# Patient Record
Sex: MALE | Race: WHITE | NOT HISPANIC OR LATINO | Employment: OTHER | URBAN - METROPOLITAN AREA
[De-identification: names, ages, dates, MRNs, and addresses within clinical notes are randomized per-mention and may not be internally consistent; named-entity substitution may affect disease eponyms.]

---

## 2017-06-28 ENCOUNTER — TELEPHONE (OUTPATIENT)
Dept: DERMATOLOGY | Facility: CLINIC | Age: 67
End: 2017-06-28

## 2017-06-28 NOTE — TELEPHONE ENCOUNTER
----- Message from Lisbeth Soria sent at 6/28/2017  3:53 PM CDT -----  Contact: pts wife at [phone]  Imani,  Pt has a non healing lesion on his hand and needs appt asap.  Non healing and bleeds.  Wife can be reached at [phone].

## 2017-06-28 NOTE — TELEPHONE ENCOUNTER
Spoke to patient and was able to book an appointment on July 10th with Dr. Carmichael. He was pleased with this appointment.

## 2017-08-01 ENCOUNTER — LAB VISIT (OUTPATIENT)
Dept: LAB | Facility: HOSPITAL | Age: 67
End: 2017-08-01
Payer: MEDICARE

## 2017-08-01 DIAGNOSIS — R39.9 LOWER URINARY TRACT SYMPTOMS (LUTS): ICD-10-CM

## 2017-08-01 DIAGNOSIS — C61 PROSTATE CANCER: ICD-10-CM

## 2017-08-01 LAB — COMPLEXED PSA SERPL-MCNC: 0.06 NG/ML

## 2017-08-01 PROCEDURE — 36415 COLL VENOUS BLD VENIPUNCTURE: CPT

## 2017-08-01 PROCEDURE — 84153 ASSAY OF PSA TOTAL: CPT

## 2017-08-07 ENCOUNTER — OFFICE VISIT (OUTPATIENT)
Dept: UROLOGY | Facility: CLINIC | Age: 67
End: 2017-08-07
Payer: MEDICARE

## 2017-08-07 VITALS
WEIGHT: 217.38 LBS | BODY MASS INDEX: 30.43 KG/M2 | DIASTOLIC BLOOD PRESSURE: 88 MMHG | HEART RATE: 70 BPM | SYSTOLIC BLOOD PRESSURE: 175 MMHG | HEIGHT: 71 IN

## 2017-08-07 DIAGNOSIS — C61 PROSTATE CANCER: Primary | ICD-10-CM

## 2017-08-07 DIAGNOSIS — Z90.79 HISTORY OF RADICAL PROSTATECTOMY: ICD-10-CM

## 2017-08-07 DIAGNOSIS — Z92.3 HISTORY OF EXTERNAL BEAM RADIATION THERAPY: ICD-10-CM

## 2017-08-07 DIAGNOSIS — N52.31 ERECTILE DYSFUNCTION FOLLOWING RADICAL PROSTATECTOMY: ICD-10-CM

## 2017-08-07 DIAGNOSIS — N32.81 URGENCY-FREQUENCY SYNDROME: ICD-10-CM

## 2017-08-07 DIAGNOSIS — R35.0 URINARY FREQUENCY: ICD-10-CM

## 2017-08-07 PROCEDURE — 1126F AMNT PAIN NOTED NONE PRSNT: CPT | Mod: S$GLB,,, | Performed by: NURSE PRACTITIONER

## 2017-08-07 PROCEDURE — 99214 OFFICE O/P EST MOD 30 MIN: CPT | Mod: S$GLB,,, | Performed by: NURSE PRACTITIONER

## 2017-08-07 PROCEDURE — 1159F MED LIST DOCD IN RCRD: CPT | Mod: S$GLB,,, | Performed by: NURSE PRACTITIONER

## 2017-08-07 PROCEDURE — 99999 PR PBB SHADOW E&M-EST. PATIENT-LVL III: CPT | Mod: PBBFAC,,, | Performed by: NURSE PRACTITIONER

## 2017-08-07 RX ORDER — SOLIFENACIN SUCCINATE 10 MG/1
10 TABLET, FILM COATED ORAL DAILY
Qty: 90 TABLET | Refills: 3 | Status: SHIPPED | OUTPATIENT
Start: 2017-08-07 | End: 2018-08-13 | Stop reason: SDUPTHER

## 2017-08-07 NOTE — PROGRESS NOTES
Subjective:       Patient ID: Antione Torres is a 67 y.o. male.    Chief Complaint: Prostate Cancer (yrly ck/psa)    Antione Torres is a 67 y.o. male with a history of prostate cancer; s/p RRP 2001 and XRT 2 years later.  He was last seen in clinic with Dr. Yun 2/12/2014.  Last seen in clinic with me on 8/5/2016.  He has some LUTS that are relieved with Vesicare 10mg daily  He uses JEOVANNY for ED.  Denies abdominal or bone pain.  Overall doing well.                 PSA                  0.06                08/01/2017                 PSA                  0.04                08/02/2016                 PSA                  0.03                08/03/2015                 PSA                  0.01                10/28/2013                  PSA                      0.01                04/30/2013                 PSA                      <0.010              10/31/2012                 PSA                      <0.010              04/13/2012                 PSA                      0.01                09/23/2011                 PSA                      0.02                03/04/2011                 PSA                      0.06                09/16/2010                 PSA                      0.13                07/02/2010                 PSA                      0.20                06/07/2010                 PSA                      0.24                04/27/2010                 PSA                      0.29                03/01/2010                              Past Medical History:  No date: Hypertension    Past Surgical History:  No date: CHOLECYSTECTOMY  No date: COLONOSCOPY  7/25/2016: COLONOSCOPY N/A      Comment: Procedure: COLONOSCOPY;  Surgeon: Stanley Woo MD;  Location: [address];                 Service: Endoscopy;  Laterality: N/A;  No date: HERNIA REPAIR  No date: PROSTATE SURGERY    No family history on file.      Social History    Marital status:              Spouse name:                        Years of education:                 Number of children:               Occupational History    None on file    Social History Main Topics    Smoking status: Former Smoker                                                                Packs/day: 0.00      Years: 0.00           Quit date: 11/7/1999       Smokeless tobacco: Not on file                       Comment: cigarettes    Alcohol use: Yes           0.6 oz/week       Shots of liquor: 1 per week       Comment: moderate    Drug use: No              Sexual activity: Yes               Partners with: Female    Other Topics            Concern    None on file    Social History Narrative    None on file        Allergies:  Pcn (penicillins)    Medications:  Current Outpatient Prescriptions:   CRESTOR 5 mg tablet, , Disp: , Rfl:   CRESTOR 5 mg tablet, , Disp: , Rfl:   losartan (COZAAR) 50 MG tablet, Take 50 mg by mouth once daily., Disp: , Rfl:    meclizine (ANTIVERT) 25 mg tablet, , Disp: , Rfl:   multivitamin (MULTIVITAMIN) per tablet, Take 1 tablet by mouth once daily., Disp: , Rfl:   naproxen (NAPROSYN) 500 MG tablet, , Disp: , Rfl:   NEXIUM 40 mg capsule, , Disp: , Rfl:   nystatin-triamcinolone (MYCOLOG II) cream, Apply topically 2 (two) times daily., Disp: 60 g, Rfl: 3  ranitidine (ZANTAC) 300 MG capsule, , Disp: , Rfl:           Review of Systems   Constitutional: Negative.  Negative for activity change, appetite change and fever.   HENT: Negative.  Negative for facial swelling and trouble swallowing.    Eyes: Negative.    Respiratory: Negative.  Negative for shortness of breath.    Cardiovascular: Negative.  Negative for chest pain and palpitations.   Gastrointestinal: Negative for abdominal pain, constipation, diarrhea, nausea and vomiting.   Genitourinary: Positive for flank pain, frequency and nocturia. Negative for difficulty urinating, dysuria, enuresis, genital sores, hematuria, penile pain, scrotal swelling, testicular pain and  urgency.        FOS is good; pleased with how he urinates with Vesicare 10mg daily  Nocturia 1-2x depending on fluid intake     Musculoskeletal: Negative for back pain, gait problem and neck stiffness.   Skin: Negative.  Negative for wound.   Neurological: Negative for dizziness, tremors, seizures, syncope, speech difficulty, light-headedness and headaches.   Hematological: Does not bruise/bleed easily.   Psychiatric/Behavioral: Negative for confusion and hallucinations. The patient is not nervous/anxious.        Objective:      Physical Exam   Nursing note and vitals reviewed.  Constitutional: He is oriented to person, place, and time. Vital signs are normal. He appears well-developed and well-nourished. He is active and cooperative.  Non-toxic appearance. He does not have a sickly appearance.   HENT:   Head: Normocephalic and atraumatic.   Right Ear: External ear normal.   Left Ear: External ear normal.   Nose: Nose normal.   Mouth/Throat: Mucous membranes are normal.   Eyes: Conjunctivae and lids are normal. No scleral icterus.   Neck: Trachea normal, normal range of motion and full passive range of motion without pain. Neck supple. No JVD present. No tracheal deviation present.   Cardiovascular: Normal rate, regular rhythm, S1 normal and S2 normal.    Pulmonary/Chest: Effort normal and breath sounds normal. No respiratory distress. He exhibits no tenderness.   Abdominal: Soft. Normal appearance and bowel sounds are normal. There is no hepatosplenomegaly. There is no tenderness. There is no rebound, no guarding and no CVA tenderness.   Genitourinary: Testes normal and penis normal. Rectal exam shows no external hemorrhoid, no mass and no tenderness. Right testis shows no mass, no swelling and no tenderness. Left testis shows no mass, no swelling and no tenderness. Uncircumcised. No phimosis, paraphimosis, hypospadias, penile erythema or penile tenderness. No discharge found.       Musculoskeletal: Normal range of  motion.   Lymphadenopathy: No inguinal adenopathy noted on the right or left side.   Neurological: He is alert and oriented to person, place, and time. He has normal strength.   Skin: Skin is warm, dry and intact.     Psychiatric: He has a normal mood and affect. His behavior is normal. Judgment and thought content normal.       Assessment:       1. Prostate cancer    2. History of radical prostatectomy    3. Erectile dysfunction following radical prostatectomy    4. History of external beam radiation therapy    5. Urinary frequency    6. Urgency-frequency syndrome        Plan:         I spent 25 minutes with the patient of which more than half was spent in direct consultation with the patient in regards to our treatment and plan.    Education and recommendations of today's plan of care including home remedies.  Diet modifications;  Avoiding bladder irritants to improve LUTS  Daily exercise; f/u with PCP  Refilled Vesicare;   Discussed ED and contributory factors; continue JEOVANNY  RTC one year with PSA.

## 2017-08-08 ENCOUNTER — PATIENT MESSAGE (OUTPATIENT)
Dept: UROLOGY | Facility: CLINIC | Age: 67
End: 2017-08-08

## 2017-10-02 ENCOUNTER — CLINICAL SUPPORT (OUTPATIENT)
Dept: AUDIOLOGY | Facility: CLINIC | Age: 67
End: 2017-10-02
Payer: MEDICARE

## 2017-10-02 DIAGNOSIS — H81.4 CENTRAL VESTIBULAR VERTIGO: Primary | ICD-10-CM

## 2017-10-02 PROCEDURE — 92537 CALORIC VSTBLR TEST W/REC: CPT | Mod: S$GLB,,, | Performed by: AUDIOLOGIST

## 2017-10-02 PROCEDURE — 92540 BASIC VESTIBULAR EVALUATION: CPT | Mod: S$GLB,,, | Performed by: AUDIOLOGIST

## 2017-10-02 NOTE — PROGRESS NOTES
VNG/Postuography Evaluation    Referring physician:  Dr. Escobar    67 y.o. male complains of vertigo, nausea and multiple falls.  Symptoms are provoked by quick head turns, arising from bed or arising from a seated position and at times spontaneously and have been recurring over the past 12 years but has gotten progressively more frequent over the past year.  PMH is positive for a concussion without loss of consciousness while in the [employer]  in , resulting in temporary hearing loss and tinnitus in the right ear.    Gaze nystagmus  was absent.  Oculomotor function was normal.  Spontaneous nystagmus was absent  The head-hanging left Hallpike revealed clinically insignificant (3 d/s) up-beating nystagmus: without vertigo.  The head-hanging right Hallpike revealed oblique nystagmus (5 d/s right-beating & 3 d/s up-beating) nystagmus: without vertigo.  Unilateral weakness: 10% ( right)  Directional preponderance 8% (right beating)  RC: 17 d/s   d/s  RW: 39 d/s  LW: 40 d/s  Fixation suppression was abnormal for 3 of the 4 caloric stimulations..    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    Recommend A trial with [place] exercises.  The patient was provided with a printed copy of the exercises for use at home.

## 2017-10-02 NOTE — Clinical Note
The [facility] results on your patient are in Epic for your review.  Thanks for the referral.  LEE Hannah, Audiologist

## 2018-08-06 ENCOUNTER — LAB VISIT (OUTPATIENT)
Dept: LAB | Facility: HOSPITAL | Age: 68
End: 2018-08-06
Payer: MEDICARE

## 2018-08-06 DIAGNOSIS — Z90.79 HISTORY OF RADICAL PROSTATECTOMY: ICD-10-CM

## 2018-08-06 DIAGNOSIS — Z92.3 HISTORY OF EXTERNAL BEAM RADIATION THERAPY: ICD-10-CM

## 2018-08-06 DIAGNOSIS — N52.31 ERECTILE DYSFUNCTION FOLLOWING RADICAL PROSTATECTOMY: ICD-10-CM

## 2018-08-06 DIAGNOSIS — C61 PROSTATE CANCER: ICD-10-CM

## 2018-08-06 LAB — COMPLEXED PSA SERPL-MCNC: 0.06 NG/ML

## 2018-08-06 PROCEDURE — 84153 ASSAY OF PSA TOTAL: CPT

## 2018-08-06 PROCEDURE — 36415 COLL VENOUS BLD VENIPUNCTURE: CPT

## 2018-08-13 ENCOUNTER — OFFICE VISIT (OUTPATIENT)
Dept: UROLOGY | Facility: CLINIC | Age: 68
End: 2018-08-13
Payer: MEDICARE

## 2018-08-13 VITALS
SYSTOLIC BLOOD PRESSURE: 139 MMHG | BODY MASS INDEX: 29.29 KG/M2 | HEIGHT: 71 IN | WEIGHT: 209.19 LBS | DIASTOLIC BLOOD PRESSURE: 87 MMHG | HEART RATE: 68 BPM

## 2018-08-13 DIAGNOSIS — R35.0 URINARY FREQUENCY: ICD-10-CM

## 2018-08-13 DIAGNOSIS — Z90.79 HISTORY OF RADICAL PROSTATECTOMY: ICD-10-CM

## 2018-08-13 DIAGNOSIS — N32.81 URGENCY-FREQUENCY SYNDROME: ICD-10-CM

## 2018-08-13 DIAGNOSIS — C61 PROSTATE CANCER: ICD-10-CM

## 2018-08-13 DIAGNOSIS — N52.31 ERECTILE DYSFUNCTION FOLLOWING RADICAL PROSTATECTOMY: ICD-10-CM

## 2018-08-13 DIAGNOSIS — Z92.3 HISTORY OF EXTERNAL BEAM RADIATION THERAPY: ICD-10-CM

## 2018-08-13 PROCEDURE — 3079F DIAST BP 80-89 MM HG: CPT | Mod: CPTII,S$GLB,, | Performed by: NURSE PRACTITIONER

## 2018-08-13 PROCEDURE — 3075F SYST BP GE 130 - 139MM HG: CPT | Mod: CPTII,S$GLB,, | Performed by: NURSE PRACTITIONER

## 2018-08-13 PROCEDURE — 99214 OFFICE O/P EST MOD 30 MIN: CPT | Mod: S$GLB,,, | Performed by: NURSE PRACTITIONER

## 2018-08-13 PROCEDURE — 99999 PR PBB SHADOW E&M-EST. PATIENT-LVL III: CPT | Mod: PBBFAC,,, | Performed by: NURSE PRACTITIONER

## 2018-08-13 RX ORDER — SOLIFENACIN SUCCINATE 10 MG/1
10 TABLET, FILM COATED ORAL DAILY
Qty: 90 TABLET | Refills: 3 | Status: SHIPPED | OUTPATIENT
Start: 2018-08-13 | End: 2020-08-11 | Stop reason: SDUPTHER

## 2018-08-13 NOTE — PROGRESS NOTES
Subjective:       Patient ID: Antione Torres is a 68 y.o. male.    Chief Complaint: Follow-up    Antione Torres is a 68 y.o. male with a history of prostate cancer; s/p RRP 2001 and XRT 2 years later.  He was seen in clinic with Dr. Yun 2/12/2014.  Last seen in clinic with me on 08/07/2017.  He has some LUTS that are relieved with Vesicare 10mg daily  He uses JEOVANNY for ED.  Denies abdominal or bone pain.  Overall doing well.                         PSA                  0.06                08/06/2018                 PSA                  0.06                08/01/2017                 PSA                  0.04                08/02/2016                 PSA                  0.03                08/03/2015                 PSA                  0.01                10/28/2013                  PSA                      0.01                04/30/2013                 PSA                      <0.010              10/31/2012                 PSA                      <0.010              04/13/2012                 PSA                      0.01                09/23/2011                 PSA                      0.02                03/04/2011                 PSA                      0.06                09/16/2010                 PSA                      0.13                07/02/2010                 PSA                      0.20                06/07/2010                 PSA                      0.24                04/27/2010                 PSA                      0.29                03/01/2010                              Past Medical History:  No date: Hypertension    Past Surgical History:  No date: CHOLECYSTECTOMY  No date: COLONOSCOPY  7/25/2016: COLONOSCOPY N/A      Comment: Procedure: COLONOSCOPY;  Surgeon: Stanley Woo MD;  Location: [address];                 Service: Endoscopy;  Laterality: N/A;  No date: HERNIA REPAIR  No date: PROSTATE SURGERY    No family history on file.      Social History     Marital status:              Spouse name:                       Years of education:                 Number of children:               Occupational History    None on file    Social History Main Topics    Smoking status: Former Smoker                                                                Packs/day: 0.00      Years: 0.00           Quit date: 11/7/1999       Smokeless tobacco: Not on file                       Comment: cigarettes    Alcohol use: Yes           0.6 oz/week       Shots of liquor: 1 per week       Comment: moderate    Drug use: No              Sexual activity: Yes               Partners with: Female    Other Topics            Concern    None on file    Social History Narrative    None on file        Allergies:  Pcn (penicillins)    Medications:  Current Outpatient Prescriptions:   CRESTOR 5 mg tablet, , Disp: , Rfl:   CRESTOR 5 mg tablet, , Disp: , Rfl:   losartan (COZAAR) 50 MG tablet, Take 50 mg by mouth once daily., Disp: , Rfl:    meclizine (ANTIVERT) 25 mg tablet, , Disp: , Rfl:   multivitamin (MULTIVITAMIN) per tablet, Take 1 tablet by mouth once daily., Disp: , Rfl:   naproxen (NAPROSYN) 500 MG tablet, , Disp: , Rfl:   NEXIUM 40 mg capsule, , Disp: , Rfl:   nystatin-triamcinolone (MYCOLOG II) cream, Apply topically 2 (two) times daily., Disp: 60 g, Rfl: 3  ranitidine (ZANTAC) 300 MG capsule, , Disp: , Rfl:           Review of Systems   Constitutional: Negative for activity change, appetite change, chills and fever.   HENT: Negative for facial swelling and trouble swallowing.    Eyes: Negative for visual disturbance.   Respiratory: Negative for chest tightness and shortness of breath.    Cardiovascular: Negative for chest pain and palpitations.   Gastrointestinal: Negative.  Negative for abdominal pain, constipation, diarrhea, nausea and vomiting.   Genitourinary: Positive for frequency and nocturia. Negative for difficulty urinating, dysuria, flank pain, hematuria, penile  pain, penile swelling, scrotal swelling and testicular pain.        He is pleased with how he urinates.  LUTS improved with taking Vesicare.  Nocturia x 1     Musculoskeletal: Negative for back pain, gait problem, myalgias and neck stiffness.   Skin: Negative for rash.   Neurological: Negative for dizziness and speech difficulty.   Hematological: Does not bruise/bleed easily.   Psychiatric/Behavioral: Negative for behavioral problems.       Objective:      Physical Exam   Nursing note and vitals reviewed.  Constitutional: He is oriented to person, place, and time. Vital signs are normal. He appears well-developed and well-nourished. He is active and cooperative.  Non-toxic appearance. He does not have a sickly appearance.   HENT:   Head: Normocephalic and atraumatic.   Right Ear: External ear normal.   Left Ear: External ear normal.   Nose: Nose normal.   Mouth/Throat: Mucous membranes are normal.   Eyes: Conjunctivae and lids are normal. No scleral icterus.   Neck: Trachea normal, normal range of motion and full passive range of motion without pain. Neck supple. No JVD present. No tracheal deviation present.   Cardiovascular: Normal rate, S1 normal and S2 normal.    Pulmonary/Chest: Effort normal. No respiratory distress. He exhibits no tenderness.   Abdominal: Soft. Normal appearance and bowel sounds are normal. There is no hepatosplenomegaly. There is no tenderness. There is no CVA tenderness.   Genitourinary: Testes normal and penis normal. Right testis shows no mass, no swelling and no tenderness. Left testis shows no mass, no swelling and no tenderness. Uncircumcised. No phimosis, paraphimosis or penile tenderness.       Musculoskeletal: Normal range of motion.   Neurological: He is alert and oriented to person, place, and time. He has normal strength.   Skin: Skin is warm, dry and intact.     Psychiatric: He has a normal mood and affect. His behavior is normal. Judgment and thought content normal.        Assessment:       1. Prostate cancer    2. History of radical prostatectomy    3. Erectile dysfunction following radical prostatectomy    4. History of external beam radiation therapy    5. Urinary frequency    6. Urgency-frequency syndrome        Plan:         I spent 25 minutes with the patient of which more than half was spent in direct consultation with the patient in regards to our treatment and plan.    Education and recommendations of today's plan of care including home remedies.  We discussed office findings; labs.  Diet modifications to reduce LUTS  RTC one year with PSA.

## 2019-05-10 ENCOUNTER — TELEPHONE (OUTPATIENT)
Dept: GASTROENTEROLOGY | Facility: CLINIC | Age: 69
End: 2019-05-10

## 2019-05-10 NOTE — TELEPHONE ENCOUNTER
Patient is aware that he was given the first available appt with . Patient will keep that appt. He also offer an appt at another location.

## 2019-05-10 NOTE — TELEPHONE ENCOUNTER
----- Message from Sophia Ye sent at 5/10/2019  9:33 AM CDT -----  Contact: [phone]/self  Patient would like to be seen sooner than the next available appointment. Please advise.

## 2019-08-12 ENCOUNTER — LAB VISIT (OUTPATIENT)
Dept: LAB | Facility: HOSPITAL | Age: 69
End: 2019-08-12
Payer: MEDICARE

## 2019-08-12 DIAGNOSIS — C61 PROSTATE CANCER: ICD-10-CM

## 2019-08-12 DIAGNOSIS — Z90.79 HISTORY OF RADICAL PROSTATECTOMY: ICD-10-CM

## 2019-08-12 DIAGNOSIS — Z92.3 HISTORY OF EXTERNAL BEAM RADIATION THERAPY: ICD-10-CM

## 2019-08-12 LAB — COMPLEXED PSA SERPL-MCNC: 0.06 NG/ML (ref 0–4)

## 2019-08-12 PROCEDURE — 84153 ASSAY OF PSA TOTAL: CPT

## 2019-08-12 PROCEDURE — 36415 COLL VENOUS BLD VENIPUNCTURE: CPT

## 2019-08-19 ENCOUNTER — OFFICE VISIT (OUTPATIENT)
Dept: UROLOGY | Facility: CLINIC | Age: 69
End: 2019-08-19
Payer: MEDICARE

## 2019-08-19 VITALS
HEART RATE: 62 BPM | SYSTOLIC BLOOD PRESSURE: 141 MMHG | DIASTOLIC BLOOD PRESSURE: 76 MMHG | BODY MASS INDEX: 28.89 KG/M2 | WEIGHT: 206.38 LBS | HEIGHT: 71 IN

## 2019-08-19 DIAGNOSIS — R39.15 URINARY URGENCY: ICD-10-CM

## 2019-08-19 DIAGNOSIS — Z90.79 HISTORY OF ROBOT-ASSISTED LAPAROSCOPIC RADICAL PROSTATECTOMY: ICD-10-CM

## 2019-08-19 DIAGNOSIS — C61 PROSTATE CANCER: Primary | ICD-10-CM

## 2019-08-19 DIAGNOSIS — R97.21 RISING PSA FOLLOWING TREATMENT FOR MALIGNANT NEOPLASM OF PROSTATE: ICD-10-CM

## 2019-08-19 DIAGNOSIS — Z92.3 HISTORY OF EXTERNAL BEAM RADIATION THERAPY: ICD-10-CM

## 2019-08-19 DIAGNOSIS — N52.31 ERECTILE DYSFUNCTION FOLLOWING RADICAL PROSTATECTOMY: ICD-10-CM

## 2019-08-19 PROCEDURE — 3078F DIAST BP <80 MM HG: CPT | Mod: CPTII,S$GLB,, | Performed by: NURSE PRACTITIONER

## 2019-08-19 PROCEDURE — 99999 PR PBB SHADOW E&M-EST. PATIENT-LVL IV: ICD-10-PCS | Mod: PBBFAC,,, | Performed by: NURSE PRACTITIONER

## 2019-08-19 PROCEDURE — 3077F SYST BP >= 140 MM HG: CPT | Mod: CPTII,S$GLB,, | Performed by: NURSE PRACTITIONER

## 2019-08-19 PROCEDURE — 3077F PR MOST RECENT SYSTOLIC BLOOD PRESSURE >= 140 MM HG: ICD-10-PCS | Mod: CPTII,S$GLB,, | Performed by: NURSE PRACTITIONER

## 2019-08-19 PROCEDURE — 99999 PR PBB SHADOW E&M-EST. PATIENT-LVL IV: CPT | Mod: PBBFAC,,, | Performed by: NURSE PRACTITIONER

## 2019-08-19 PROCEDURE — 99214 OFFICE O/P EST MOD 30 MIN: CPT | Mod: S$GLB,,, | Performed by: NURSE PRACTITIONER

## 2019-08-19 PROCEDURE — 3078F PR MOST RECENT DIASTOLIC BLOOD PRESSURE < 80 MM HG: ICD-10-PCS | Mod: CPTII,S$GLB,, | Performed by: NURSE PRACTITIONER

## 2019-08-19 PROCEDURE — 1101F PR PT FALLS ASSESS DOC 0-1 FALLS W/OUT INJ PAST YR: ICD-10-PCS | Mod: CPTII,S$GLB,, | Performed by: NURSE PRACTITIONER

## 2019-08-19 PROCEDURE — 1101F PT FALLS ASSESS-DOCD LE1/YR: CPT | Mod: CPTII,S$GLB,, | Performed by: NURSE PRACTITIONER

## 2019-08-19 PROCEDURE — 99214 PR OFFICE/OUTPT VISIT, EST, LEVL IV, 30-39 MIN: ICD-10-PCS | Mod: S$GLB,,, | Performed by: NURSE PRACTITIONER

## 2019-08-19 NOTE — PATIENT INSTRUCTIONS
PSA                  0.06                08/12/2019                 PSA                  0.06                08/06/2018                 PSA                  0.06                08/01/2017                 PSA                  0.04                08/02/2016                 PSA                  0.03                08/03/2015                 PSA                  0.01                10/28/2013                  PSA                      0.01                04/30/2013                 PSA                      <0.010              10/31/2012                 PSA                      <0.010              04/13/2012                 PSA                      0.01                09/23/2011                 PSA                      0.02                03/04/2011                 PSA                      0.06                09/16/2010                 PSA                      0.13                07/02/2010                 PSA                      0.20                06/07/2010                 PSA                      0.24                04/27/2010                 PSA                      0.29                03/01/2010                            What Is Prostate Cancer?    Cancer is when cells in the body change and grow out of control. Cancer cells can form lumps of tissue called tumors. Cancer that starts in the prostate is called prostate cancer. It can grow and spread beyond the prostate. Cancer that spreads is harder to treat.  Understanding the prostate  The prostate is a gland in men about the size and shape of a walnut. It surrounds the upper part of the urethra. This is the tube that carries urine from the bladder. The prostate makes some of the fluid thats part of semen. During orgasm, semen leaves the body through the urethra.  When prostate cancer forms  As a man ages, the cells of his prostate may change to form tumors or other growths. The types of growths include:  · Noncancerous growths. As a man ages, the  prostate may grow larger. This is called benign prostatic hyperplasia (BPH). With BPH, extra prostate tissue often squeezes the urethra, causing symptoms such as trouble urinating. But BPH is not cancer and does not lead to cancer.  · Atypical cells. Sometimes prostate cells dont look like normal (typical) prostate cells. One type of abnormal growth is called prostatic intraepithelial neoplasia or PIN. Although PIN cells are not cancer cells, they may be a sign that cancer is likely to form.  · Cancer. When abnormal prostate cells grow out of control and start to invade other tissues, they are called cancer cells. These cells may or may not lead to symptoms. Some tumors can be felt during a physical exam, and some cant. Prostate cancer may grow into nearby organs or spread to nearby lymph nodes. Lymph nodes are small organs around the body that are part of the immune system. In some cases, the cancer spreads to bones or organs in distant parts of the body. This is called metastasis.  Diagnosing prostate cancer  Prostate cancer may not cause symptoms at first. Urinary problems are often not a sign of cancer, but of another condition, such as BPH. To find out if you have prostate cancer, your healthcare provider must examine you and order tests. The tests help confirm a diagnosis of cancer. They also help give more information about a cancerous tumor. Tests might include:  · Prostate specific antigen (PSA) testing. PSA is a chemical made by prostate tissue. The amount of PSA in the blood (PSA level) is tested to check a mans risk for prostate cancer. In general, a high or rising PSA level may mean an increased cancer risk. A PSA test by itself cannot show if a man has prostate cancer. PSA testing is also used to check the success of cancer treatments.  · Core needle biopsy. This test is done to determine if a man has prostate cancer. A hollow needle is used to take small pieces of tissue from the prostate. This  helps give more information about the cells. Before the test, pain medicine may be given to prevent pain. During the test, a small probe is inserted into the rectum. The probe sends an image of the prostate to a video monitor. With this image as a guide, the healthcare provider uses a thin, hollow needle to remove tiny tissue samples from the prostate. These are sent to a lab where they are looked at for cancer cells.  Date Last Reviewed: 5/1/2017 © 2000-2017 [employer]. [address] [place] [address]. All rights reserved. This information is not intended as a substitute for professional medical care. Always follow your healthcare professional's instructions.

## 2019-08-19 NOTE — PROGRESS NOTES
Subjective:       Patient ID: Antione Torres is a 69 y.o. male.    Chief Complaint: Prostate Cancer    Antione Torres is a 69 y.o. male with a history of prostate cancer; s/p RRP 2001 and XRT 2 years later.  He was seen in clinic with Dr. Yun 2/12/2014.    Last seen in clinic with me on 08/13/2018.  He has some LUTS that are relieved with Vesicare 10mg daily  [facility]    He having a telemed conference with new PCP/Psych to review meds since having issues with dizziness.    He uses [facility] for ED.  Denies abdominal or bone pain.  Overall doing well.                       PSA                  0.06                08/12/2019                 PSA                  0.06                08/06/2018                 PSA                  0.06                08/01/2017                 PSA                  0.04                08/02/2016                 PSA                  0.03                08/03/2015                 PSA                  0.01                10/28/2013                  PSA                      0.01                04/30/2013                 PSA                      <0.010              10/31/2012                 PSA                      <0.010              04/13/2012                 PSA                      0.01                09/23/2011                 PSA                      0.02                03/04/2011                 PSA                      0.06                09/16/2010                 PSA                      0.13                07/02/2010                 PSA                      0.20                06/07/2010                 PSA                      0.24                04/27/2010                 PSA                      0.29                03/01/2010                              Past Medical History:  No date: Hypertension    Past Surgical History:  No date: CHOLECYSTECTOMY  No date: COLONOSCOPY  7/25/2016: COLONOSCOPY N/A      Comment: Procedure: COLONOSCOPY;  Surgeon: Stanley FRANKLIN                 MD Amna;  Location: [facility] ([address] [place] [address]);                 Service: Endoscopy;  Laterality: N/A;  No date: HERNIA REPAIR  No date: PROSTATE SURGERY    No family history on file.      Social History    Marital status:              Spouse name:                       Years of education:                 Number of children:               Occupational History    None on file    Social History Main Topics    Smoking status: Former Smoker                                                                Packs/day: 0.00      Years: 0.00           Quit date: 11/7/1999       Smokeless tobacco: Not on file                       Comment: cigarettes    Alcohol use: Yes           0.6 oz/week       Shots of liquor: 1 per week       Comment: moderate    Drug use: No              Sexual activity: Yes               Partners with: Female    Other Topics            Concern    None on file    Social History Narrative    None on file        Allergies:  Pcn (penicillins)    Medications:  Current Outpatient Prescriptions:   CRESTOR 5 mg tablet, , Disp: , Rfl:   CRESTOR 5 mg tablet, , Disp: , Rfl:   losartan (COZAAR) 50 MG tablet, Take 50 mg by mouth once daily., Disp: , Rfl:    meclizine (ANTIVERT) 25 mg tablet, , Disp: , Rfl:   multivitamin (MULTIVITAMIN) per tablet, Take 1 tablet by mouth once daily., Disp: , Rfl:   naproxen (NAPROSYN) 500 MG tablet, , Disp: , Rfl:   NEXIUM 40 mg capsule, , Disp: , Rfl:   nystatin-triamcinolone (MYCOLOG II) cream, Apply topically 2 (two) times daily., Disp: 60 g, Rfl: 3  ranitidine (ZANTAC) 300 MG capsule, , Disp: , Rfl:         Review of Systems   Constitutional: Negative for activity change, appetite change, chills and fever.   HENT: Negative for facial swelling and trouble swallowing.    Eyes: Negative for visual disturbance.   Respiratory: Negative for chest tightness and shortness of breath.    Cardiovascular: Negative for chest pain and palpitations.    Gastrointestinal: Negative.  Negative for abdominal pain, constipation, diarrhea, nausea and vomiting.   Genitourinary: Positive for frequency and nocturia. Negative for difficulty urinating, dysuria, flank pain, hematuria, penile pain, penile swelling, scrotal swelling and testicular pain.        He is pleased with how he urinates.     Musculoskeletal: Negative for back pain, gait problem, myalgias and neck stiffness.   Skin: Negative for rash.   Neurological: Positive for dizziness. Negative for speech difficulty.   Hematological: Does not bruise/bleed easily.   Psychiatric/Behavioral: Negative for behavioral problems.       Objective:      Physical Exam   Nursing note and vitals reviewed.  Constitutional: He is oriented to person, place, and time. Vital signs are normal. He appears well-developed and well-nourished. He is active and cooperative.  Non-toxic appearance. He does not have a sickly appearance.   Urine dipped clear of infection.     HENT:   Head: Normocephalic and atraumatic.   Right Ear: External ear normal.   Left Ear: External ear normal.   Nose: Nose normal.   Mouth/Throat: Mucous membranes are normal.   Eyes: Conjunctivae and lids are normal. No scleral icterus.   Neck: Trachea normal, normal range of motion and full passive range of motion without pain. Neck supple. No JVD present. No tracheal deviation present.   Cardiovascular: Normal rate, S1 normal and S2 normal.    Pulmonary/Chest: Effort normal. No respiratory distress. He exhibits no tenderness.   Abdominal: Soft. Normal appearance and bowel sounds are normal. There is no hepatosplenomegaly. There is no tenderness. There is no CVA tenderness.   Genitourinary: Rectum normal, testes normal and penis normal.       Musculoskeletal: Normal range of motion.   Neurological: He is alert and oriented to person, place, and time. He has normal strength.   Skin: Skin is warm, dry and intact.     Psychiatric: He has a normal mood and affect. His  behavior is normal. Judgment and thought content normal.       Assessment:       1. Prostate cancer    2. History of robot-assisted laparoscopic radical prostatectomy    3. Rising PSA following treatment for malignant neoplasm of prostate    4. History of external beam radiation therapy    5. Urinary urgency    6. Erectile dysfunction following radical prostatectomy        Plan:         I spent 25 minutes with the patient of which more than half was spent in direct consultation with the patient in regards to our treatment and plan.    Education and recommendations of today's plan of care including home remedies.  We discussed his PSA results; reassurance  Discussed his LUTS; diet modifications   Ok to continue generic vesicare; if dizziness continues then look at changing to another.  Continue JEOVANNY  RTC one year with PSA

## 2019-09-12 DIAGNOSIS — M54.16 LUMBAR RADICULOPATHY: Primary | ICD-10-CM

## 2020-03-02 PROBLEM — R53.1 WEAKNESS: Status: ACTIVE | Noted: 2020-03-02

## 2020-03-02 PROBLEM — R26.81 UNSTEADY GAIT: Status: ACTIVE | Noted: 2020-03-02

## 2020-03-02 PROBLEM — M54.9 BACK PAIN: Status: ACTIVE | Noted: 2020-03-02

## 2020-05-20 ENCOUNTER — HOSPITAL ENCOUNTER (OUTPATIENT)
Dept: RADIOLOGY | Facility: HOSPITAL | Age: 70
Discharge: HOME | End: 2020-05-20
Attending: PSYCHIATRY & NEUROLOGY
Payer: MEDICARE

## 2020-05-20 PROCEDURE — 72148 MRI LUMBAR SPINE W/O DYE: CPT | Mod: TC

## 2020-05-20 PROCEDURE — 72148 MRI LUMBAR SPINE W/O DYE: CPT | Mod: 26,,, | Performed by: RADIOLOGY

## 2020-05-20 PROCEDURE — 72148 MRI LUMBAR SPINE WITHOUT CONTRAST: ICD-10-PCS | Mod: 26,,, | Performed by: RADIOLOGY

## 2020-08-11 ENCOUNTER — OFFICE VISIT (OUTPATIENT)
Dept: UROLOGY | Facility: CLINIC | Age: 70
End: 2020-08-11
Payer: MEDICARE

## 2020-08-11 VITALS
DIASTOLIC BLOOD PRESSURE: 82 MMHG | WEIGHT: 206.69 LBS | SYSTOLIC BLOOD PRESSURE: 162 MMHG | BODY MASS INDEX: 28.83 KG/M2 | HEART RATE: 71 BPM

## 2020-08-11 DIAGNOSIS — C61 PROSTATE CANCER: Primary | ICD-10-CM

## 2020-08-11 DIAGNOSIS — Z90.79 HISTORY OF RADICAL PROSTATECTOMY: ICD-10-CM

## 2020-08-11 DIAGNOSIS — N52.31 ERECTILE DYSFUNCTION FOLLOWING RADICAL PROSTATECTOMY: ICD-10-CM

## 2020-08-11 DIAGNOSIS — Z92.3 HISTORY OF EXTERNAL BEAM RADIATION THERAPY: ICD-10-CM

## 2020-08-11 DIAGNOSIS — N32.81 URGENCY-FREQUENCY SYNDROME: ICD-10-CM

## 2020-08-11 DIAGNOSIS — R35.0 URINARY FREQUENCY: ICD-10-CM

## 2020-08-11 PROCEDURE — 99214 OFFICE O/P EST MOD 30 MIN: CPT | Mod: PBBFAC | Performed by: NURSE PRACTITIONER

## 2020-08-11 PROCEDURE — 99999 PR PBB SHADOW E&M-EST. PATIENT-LVL IV: CPT | Mod: PBBFAC,,, | Performed by: NURSE PRACTITIONER

## 2020-08-11 PROCEDURE — 99999 PR PBB SHADOW E&M-EST. PATIENT-LVL IV: ICD-10-PCS | Mod: PBBFAC,,, | Performed by: NURSE PRACTITIONER

## 2020-08-11 PROCEDURE — 99214 PR OFFICE/OUTPT VISIT, EST, LEVL IV, 30-39 MIN: ICD-10-PCS | Mod: S$PBB,,, | Performed by: NURSE PRACTITIONER

## 2020-08-11 PROCEDURE — 99214 OFFICE O/P EST MOD 30 MIN: CPT | Mod: S$PBB,,, | Performed by: NURSE PRACTITIONER

## 2020-08-11 RX ORDER — SOLIFENACIN SUCCINATE 10 MG/1
10 TABLET, FILM COATED ORAL DAILY
Qty: 90 TABLET | Refills: 3 | Status: SHIPPED | OUTPATIENT
Start: 2020-08-11 | End: 2020-08-11 | Stop reason: SDUPTHER

## 2020-08-11 RX ORDER — SOLIFENACIN SUCCINATE 10 MG/1
10 TABLET, FILM COATED ORAL DAILY
Qty: 90 TABLET | Refills: 3 | Status: SHIPPED | OUTPATIENT
Start: 2020-08-11 | End: 2021-08-09 | Stop reason: SDUPTHER

## 2020-08-11 NOTE — PATIENT INSTRUCTIONS
PSA                  0.05                08/04/2020                 PSA                  0.06                08/12/2019                 PSA                  0.06                08/06/2018                 PSA                  0.06                08/01/2017                 PSA                  0.04                08/02/2016                 PSA                  0.03                08/03/2015                 PSA                  0.01                10/28/2013                  PSA                      0.01                04/30/2013                 PSA                      <0.010              10/31/2012                 PSA                      <0.010              04/13/2012                 PSA                      0.01                09/23/2011                 PSA                      0.02                03/04/2011                 PSA                      0.06                09/16/2010                 PSA                      0.13                07/02/2010                 PSA                      0.20                06/07/2010                 PSA                      0.24                04/27/2010                 PSA                      0.29                03/01/2010

## 2020-08-11 NOTE — PROGRESS NOTES
Subjective:       Patient ID: Antione Torres is a 70 y.o. male.    Chief Complaint: Follow-up    Antione Torres is a 70 y.o. male with a history of prostate cancer; s/p RRP 2001 and XRT 2 years later.  He was seen in clinic with Dr. Yun 2/12/2014.    Last seen in clinic with me on 08/19/2019.  He has some LUTS that are relieved with Vesicare 10mg daily  [facility]    He uses [facility] for ED.  Denies abdominal or bone pain.  Overall doing well.  His wife had Covid; mild version; no hospitalization needed.                      PSA                  0.05                08/04/2020                 PSA                  0.06                08/12/2019                 PSA                  0.06                08/06/2018                 PSA                  0.06                08/01/2017                 PSA                  0.04                08/02/2016                 PSA                  0.03                08/03/2015                 PSA                  0.01                10/28/2013                  PSA                      0.01                04/30/2013                 PSA                      <0.010              10/31/2012                 PSA                      <0.010              04/13/2012                 PSA                      0.01                09/23/2011                 PSA                      0.02                03/04/2011                 PSA                      0.06                09/16/2010                 PSA                      0.13                07/02/2010                 PSA                      0.20                06/07/2010                 PSA                      0.24                04/27/2010                 PSA                      0.29                03/01/2010                                Past Medical History:  No date: Hypertension    Past Surgical History:  No date: CHOLECYSTECTOMY  No date: COLONOSCOPY  7/25/2016: COLONOSCOPY N/A      Comment: Procedure: COLONOSCOPY;   Surgeon: Stanley Woo MD;  Location: [facility] ([address] [place] [address]);                 Service: Endoscopy;  Laterality: N/A;  No date: HERNIA REPAIR  No date: PROSTATE SURGERY    No family history on file.      Social History    Marital status:              Spouse name:                       Years of education:                 Number of children:               Occupational History    None on file    Social History Main Topics    Smoking status: Former Smoker                                                                Packs/day: 0.00      Years: 0.00           Quit date: 11/7/1999       Smokeless tobacco: Not on file                       Comment: cigarettes    Alcohol use: Yes           0.6 oz/week       Shots of liquor: 1 per week       Comment: moderate    Drug use: No              Sexual activity: Yes               Partners with: Female    Other Topics            Concern    None on file    Social History Narrative    None on file        Allergies:  Pcn (penicillins)    Medications:  Current Outpatient Prescriptions:   CRESTOR 5 mg tablet, , Disp: , Rfl:   CRESTOR 5 mg tablet, , Disp: , Rfl:   losartan (COZAAR) 50 MG tablet, Take 50 mg by mouth once daily., Disp: , Rfl:    meclizine (ANTIVERT) 25 mg tablet, , Disp: , Rfl:   multivitamin (MULTIVITAMIN) per tablet, Take 1 tablet by mouth once daily., Disp: , Rfl:   naproxen (NAPROSYN) 500 MG tablet, , Disp: , Rfl:   NEXIUM 40 mg capsule, , Disp: , Rfl:   nystatin-triamcinolone (MYCOLOG II) cream, Apply topically 2 (two) times daily., Disp: 60 g, Rfl: 3  ranitidine (ZANTAC) 300 MG capsule, , Disp: , Rfl:         Review of Systems   Constitutional: Negative for activity change, appetite change, chills and fever.   HENT: Negative for facial swelling and trouble swallowing.    Eyes: Negative for visual disturbance.   Respiratory: Negative for chest tightness and shortness of breath.    Cardiovascular: Negative for chest pain and palpitations.    Gastrointestinal: Negative.  Negative for abdominal pain, constipation, diarrhea, nausea and vomiting.   Genitourinary: Positive for nocturia. Negative for difficulty urinating, dysuria, flank pain, hematuria, penile pain, penile swelling, scrotal swelling and testicular pain.        FOS is good for him.  Nocturia 0-1 with Vesicare     Musculoskeletal: Negative for back pain, gait problem, myalgias and neck stiffness.   Skin: Negative for rash.   Neurological: Negative for dizziness and speech difficulty.   Hematological: Does not bruise/bleed easily.   Psychiatric/Behavioral: Negative for behavioral problems.       Objective:      Physical Exam   Nursing note and vitals reviewed.  Constitutional: He is oriented to person, place, and time. He appears well-developed. He is cooperative.  Non-toxic appearance.   Urine was clear.     HENT:   Head: Normocephalic and atraumatic.   Right Ear: External ear normal.   Left Ear: External ear normal.   Nose: Nose normal.   Eyes: Conjunctivae and lids are normal. No scleral icterus.   Neck: Trachea normal, normal range of motion and full passive range of motion without pain. Neck supple. No JVD present. No tracheal deviation present.   Cardiovascular: Normal rate, S1 normal and S2 normal.    Pulmonary/Chest: Effort normal. No respiratory distress. He exhibits no tenderness.   Abdominal: Soft. Normal appearance. There is no abdominal tenderness.   Genitourinary:    Testes and penis normal.         Musculoskeletal: Normal range of motion.   Neurological: He is alert and oriented to person, place, and time.   Skin: Skin is warm and dry.     Psychiatric: His behavior is normal. Judgment and thought content normal.       Assessment:       1. Prostate cancer    2. History of radical prostatectomy    3. Erectile dysfunction following radical prostatectomy    4. History of external beam radiation therapy    5. Urinary frequency    6. Urgency-frequency syndrome        Plan:         I  spent 25 minutes with the patient of which more than half was spent in direct consultation with the patient in regards to our treatment and plan.    Education and recommendations of today's plan of care including home remedies.  Diet and exercise  Continue JEOVANNY/Vesicare Rx printed and mailed out.  RTC one year with PSA

## 2021-01-10 ENCOUNTER — IMMUNIZATION (OUTPATIENT)
Dept: FAMILY MEDICINE | Facility: CLINIC | Age: 71
End: 2021-01-10
Payer: COMMERCIAL

## 2021-01-10 DIAGNOSIS — Z23 NEED FOR VACCINATION: ICD-10-CM

## 2021-01-10 PROCEDURE — 91300 COVID-19, MRNA, LNP-S, PF, 30 MCG/0.3 ML DOSE VACCINE: CPT | Mod: PBBFAC | Performed by: FAMILY MEDICINE

## 2021-01-19 PROBLEM — R26.89 POOR BALANCE: Status: ACTIVE | Noted: 2021-01-19

## 2021-01-19 PROBLEM — R29.898 DECREASED STRENGTH OF LOWER EXTREMITY: Status: ACTIVE | Noted: 2021-01-19

## 2021-01-31 ENCOUNTER — IMMUNIZATION (OUTPATIENT)
Dept: FAMILY MEDICINE | Facility: CLINIC | Age: 71
End: 2021-01-31
Payer: MEDICARE

## 2021-01-31 DIAGNOSIS — Z23 NEED FOR VACCINATION: Primary | ICD-10-CM

## 2021-01-31 PROCEDURE — 91300 COVID-19, MRNA, LNP-S, PF, 30 MCG/0.3 ML DOSE VACCINE: CPT | Mod: PBBFAC | Performed by: FAMILY MEDICINE

## 2021-01-31 PROCEDURE — 0002A COVID-19, MRNA, LNP-S, PF, 30 MCG/0.3 ML DOSE VACCINE: CPT | Mod: PBBFAC | Performed by: FAMILY MEDICINE

## 2021-07-30 ENCOUNTER — LAB VISIT (OUTPATIENT)
Dept: LAB | Facility: HOSPITAL | Age: 71
End: 2021-07-30
Payer: MEDICARE

## 2021-07-30 DIAGNOSIS — C61 PROSTATE CANCER: ICD-10-CM

## 2021-07-30 LAB — COMPLEXED PSA SERPL-MCNC: 0.08 NG/ML (ref 0–4)

## 2021-07-30 PROCEDURE — 36415 COLL VENOUS BLD VENIPUNCTURE: CPT | Performed by: NURSE PRACTITIONER

## 2021-07-30 PROCEDURE — 84153 ASSAY OF PSA TOTAL: CPT | Performed by: NURSE PRACTITIONER

## 2021-08-09 ENCOUNTER — OFFICE VISIT (OUTPATIENT)
Dept: UROLOGY | Facility: CLINIC | Age: 71
End: 2021-08-09
Payer: MEDICARE

## 2021-08-09 VITALS
HEART RATE: 82 BPM | SYSTOLIC BLOOD PRESSURE: 136 MMHG | HEIGHT: 71 IN | WEIGHT: 198 LBS | DIASTOLIC BLOOD PRESSURE: 73 MMHG | BODY MASS INDEX: 27.72 KG/M2

## 2021-08-09 DIAGNOSIS — N52.31 ERECTILE DYSFUNCTION FOLLOWING RADICAL PROSTATECTOMY: ICD-10-CM

## 2021-08-09 DIAGNOSIS — N32.81 URGENCY-FREQUENCY SYNDROME: ICD-10-CM

## 2021-08-09 DIAGNOSIS — R35.0 URINARY FREQUENCY: ICD-10-CM

## 2021-08-09 DIAGNOSIS — Z90.79 HISTORY OF RADICAL RETROPUBIC PROSTATECTOMY: ICD-10-CM

## 2021-08-09 DIAGNOSIS — R97.21 RISING PSA FOLLOWING TREATMENT FOR MALIGNANT NEOPLASM OF PROSTATE: ICD-10-CM

## 2021-08-09 DIAGNOSIS — Z92.3 HISTORY OF EXTERNAL BEAM RADIATION THERAPY: ICD-10-CM

## 2021-08-09 DIAGNOSIS — C61 PROSTATE CANCER: Primary | ICD-10-CM

## 2021-08-09 PROCEDURE — 99214 PR OFFICE/OUTPT VISIT, EST, LEVL IV, 30-39 MIN: ICD-10-PCS | Mod: S$PBB,,, | Performed by: NURSE PRACTITIONER

## 2021-08-09 PROCEDURE — 99214 OFFICE O/P EST MOD 30 MIN: CPT | Mod: PBBFAC | Performed by: NURSE PRACTITIONER

## 2021-08-09 PROCEDURE — 99999 PR PBB SHADOW E&M-EST. PATIENT-LVL IV: ICD-10-PCS | Mod: PBBFAC,,, | Performed by: NURSE PRACTITIONER

## 2021-08-09 PROCEDURE — 99214 OFFICE O/P EST MOD 30 MIN: CPT | Mod: S$PBB,,, | Performed by: NURSE PRACTITIONER

## 2021-08-09 PROCEDURE — 99999 PR PBB SHADOW E&M-EST. PATIENT-LVL IV: CPT | Mod: PBBFAC,,, | Performed by: NURSE PRACTITIONER

## 2021-08-09 RX ORDER — SOLIFENACIN SUCCINATE 10 MG/1
10 TABLET, FILM COATED ORAL DAILY
Qty: 90 TABLET | Refills: 3 | Status: SHIPPED | OUTPATIENT
Start: 2021-08-09 | End: 2021-08-09 | Stop reason: SDUPTHER

## 2021-08-09 RX ORDER — SOLIFENACIN SUCCINATE 10 MG/1
10 TABLET, FILM COATED ORAL DAILY
Qty: 90 TABLET | Refills: 3 | Status: SHIPPED | OUTPATIENT
Start: 2021-08-09 | End: 2022-08-15 | Stop reason: SDUPTHER

## 2021-11-22 ENCOUNTER — PATIENT MESSAGE (OUTPATIENT)
Dept: UROLOGY | Facility: CLINIC | Age: 71
End: 2021-11-22
Payer: MEDICARE

## 2021-12-01 ENCOUNTER — OFFICE VISIT (OUTPATIENT)
Dept: UROLOGY | Facility: CLINIC | Age: 71
End: 2021-12-01
Payer: MEDICARE

## 2021-12-01 VITALS
HEIGHT: 71 IN | WEIGHT: 197.56 LBS | HEART RATE: 64 BPM | SYSTOLIC BLOOD PRESSURE: 146 MMHG | DIASTOLIC BLOOD PRESSURE: 78 MMHG | BODY MASS INDEX: 27.66 KG/M2

## 2021-12-01 DIAGNOSIS — R31.29 MICROSCOPIC HEMATURIA: Primary | ICD-10-CM

## 2021-12-01 DIAGNOSIS — C61 PROSTATE CANCER: ICD-10-CM

## 2021-12-01 DIAGNOSIS — Z90.79 HISTORY OF RADICAL PROSTATECTOMY: ICD-10-CM

## 2021-12-01 DIAGNOSIS — Z92.3 HISTORY OF EXTERNAL BEAM RADIATION THERAPY: ICD-10-CM

## 2021-12-01 LAB
BILIRUB UR QL STRIP: NEGATIVE
CAOX CRY UR QL COMP ASSIST: NORMAL
CLARITY UR REFRACT.AUTO: ABNORMAL
COLOR UR AUTO: ABNORMAL
GLUCOSE UR QL STRIP: NEGATIVE
HGB UR QL STRIP: NEGATIVE
KETONES UR QL STRIP: ABNORMAL
LEUKOCYTE ESTERASE UR QL STRIP: NEGATIVE
MICROSCOPIC COMMENT: NORMAL
NITRITE UR QL STRIP: NEGATIVE
PH UR STRIP: 6 [PH] (ref 5–8)
PROT UR QL STRIP: NEGATIVE
RBC #/AREA URNS AUTO: 2 /HPF (ref 0–4)
SP GR UR STRIP: 1.02 (ref 1–1.03)
SQUAMOUS #/AREA URNS AUTO: 0 /HPF
URN SPEC COLLECT METH UR: ABNORMAL
WBC #/AREA URNS AUTO: 1 /HPF (ref 0–5)

## 2021-12-01 PROCEDURE — 88112 CYTOPATH CELL ENHANCE TECH: CPT | Mod: 26,,, | Performed by: PATHOLOGY

## 2021-12-01 PROCEDURE — 99999 PR PBB SHADOW E&M-EST. PATIENT-LVL IV: ICD-10-PCS | Mod: PBBFAC,,, | Performed by: NURSE PRACTITIONER

## 2021-12-01 PROCEDURE — 99214 OFFICE O/P EST MOD 30 MIN: CPT | Mod: ,,, | Performed by: NURSE PRACTITIONER

## 2021-12-01 PROCEDURE — 99999 PR PBB SHADOW E&M-EST. PATIENT-LVL IV: CPT | Mod: PBBFAC,,, | Performed by: NURSE PRACTITIONER

## 2021-12-01 PROCEDURE — 99214 OFFICE O/P EST MOD 30 MIN: CPT | Mod: PBBFAC | Performed by: NURSE PRACTITIONER

## 2021-12-01 PROCEDURE — 88112 CYTOPATH CELL ENHANCE TECH: CPT | Performed by: PATHOLOGY

## 2021-12-01 PROCEDURE — 99214 PR OFFICE/OUTPT VISIT, EST, LEVL IV, 30-39 MIN: ICD-10-PCS | Mod: ,,, | Performed by: NURSE PRACTITIONER

## 2021-12-01 PROCEDURE — 81001 URINALYSIS AUTO W/SCOPE: CPT | Performed by: NURSE PRACTITIONER

## 2021-12-01 PROCEDURE — 88112 PR  CYTOPATH, CELL ENHANCE TECH: ICD-10-PCS | Mod: 26,,, | Performed by: PATHOLOGY

## 2021-12-02 LAB
FINAL PATHOLOGIC DIAGNOSIS: NORMAL
Lab: NORMAL

## 2022-02-21 ENCOUNTER — INITIAL CONSULT (OUTPATIENT)
Dept: VASCULAR SURGERY | Facility: CLINIC | Age: 72
End: 2022-02-21
Payer: MEDICARE

## 2022-02-21 VITALS
HEART RATE: 76 BPM | WEIGHT: 203.94 LBS | SYSTOLIC BLOOD PRESSURE: 146 MMHG | DIASTOLIC BLOOD PRESSURE: 72 MMHG | BODY MASS INDEX: 28.44 KG/M2

## 2022-02-21 DIAGNOSIS — R60.0 LEG EDEMA, LEFT: Primary | ICD-10-CM

## 2022-02-21 DIAGNOSIS — L81.9 DISCOLORATION OF SKIN OF TOE: ICD-10-CM

## 2022-02-21 PROCEDURE — 99999 PR PBB SHADOW E&M-EST. PATIENT-LVL III: CPT | Mod: PBBFAC,,, | Performed by: SURGERY

## 2022-02-21 PROCEDURE — 99213 OFFICE O/P EST LOW 20 MIN: CPT | Mod: PBBFAC | Performed by: SURGERY

## 2022-02-21 PROCEDURE — 99999 PR PBB SHADOW E&M-EST. PATIENT-LVL III: ICD-10-PCS | Mod: PBBFAC,,, | Performed by: SURGERY

## 2022-02-21 PROCEDURE — 99205 PR OFFICE/OUTPT VISIT, NEW, LEVL V, 60-74 MIN: ICD-10-PCS | Mod: S$PBB,,, | Performed by: SURGERY

## 2022-02-21 PROCEDURE — 99205 OFFICE O/P NEW HI 60 MIN: CPT | Mod: S$PBB,,, | Performed by: SURGERY

## 2022-02-21 NOTE — PROGRESS NOTES
Efren Sanchez MD, RPVI                                 [facility]                           [facility]                             2022    HPI:  Antione Torres is a 72 y.o. male with   Patient Active Problem List   Diagnosis    Neck pain on right side    Special screening for malignant neoplasms, colon    Weakness    Unsteady gait    Back pain    Decreased strength of lower extremity    Poor balance    being managed by PCP and specialists who is here today for evaluation of BLE pain and discoloration of toe.  Patient states location is BLE occurring for months.  Associated signs and symptoms include edema.  Quality is heavy and severity is 6/10.  Symptoms began mo ago.  Alleviating factors include elevation.  Worsening factors include dependency.  Patient is wearing compression stockings daily.  +FH of venous disease.  Symptoms do limit patient's functional status and daily activities.  no DVT history.  no venous interventions.  no low sodium diet.  no excessive water intake.    Migraine with aura: no  PFO/ASD/right to left shunt: no  Pregnant: no  Breastfeeding: no    no MI  no Stroke  Tobacco use: no    Past Medical History:   Diagnosis Date    Hyperlipidemia     Hypertension      Past Surgical History:   Procedure Laterality Date    CHOLECYSTECTOMY      COLONOSCOPY      COLONOSCOPY N/A 2016    Procedure: COLONOSCOPY;  Surgeon: Stanley Woo MD;  Location: [address];  Service: Endoscopy;  Laterality: N/A;    HERNIA REPAIR      NECK SURGERY      PROSTATE SURGERY       No family history on file.  Social History     Socioeconomic History    Marital status:    Tobacco Use    Smoking status: Former Smoker     Quit date: 1999     Years since quittin.3    Smokeless tobacco: Never Used    Tobacco comment: cigarettes   Substance and Sexual Activity    Alcohol use: Yes     Alcohol/week: 1.0 standard drink     Types: 1 Shots of  liquor per week     Comment: moderate    Drug use: No    Sexual activity: Yes     Partners: Female       Current Outpatient Medications:     apixaban (ELIQUIS) 5 mg Tab, Take 5 mg by mouth 2 (two) times daily., Disp: , Rfl:     CRESTOR 5 mg tablet, , Disp: , Rfl:     CRESTOR 5 mg tablet, , Disp: , Rfl:     losartan (COZAAR) 50 MG tablet, Take 50 mg by mouth once daily., Disp: , Rfl:     meclizine (ANTIVERT) 25 mg tablet, , Disp: , Rfl:     methocarbamol (ROBAXIN) 750 MG Tab, Take 1 tablet (750 mg total) by mouth 3 (three) times daily., Disp: 15 tablet, Rfl: 0    multivitamin (THERAGRAN) per tablet, Take 1 tablet by mouth once daily., Disp: , Rfl:     naproxen (NAPROSYN) 500 MG tablet, , Disp: , Rfl:     NEXIUM 40 mg capsule, , Disp: , Rfl:     nystatin-triamcinolone (MYCOLOG II) cream, Apply topically 2 (two) times daily., Disp: 60 g, Rfl: 3    ranitidine (ZANTAC) 300 MG capsule, , Disp: , Rfl:     solifenacin (VESICARE) 10 MG tablet, Take 1 tablet (10 mg total) by mouth once daily., Disp: 90 tablet, Rfl: 3    REVIEW OF SYSTEMS:  General: No fevers or chills; ENT: No sore throat; Allergy and Immunology: no persistent infections; Hematological and Lymphatic: No history of bleeding or easy bruising; Endocrine: negative; Respiratory: no cough, shortness of breath, or wheezing; Cardiovascular: no chest pain or dyspnea on exertion; Gastrointestinal: no abdominal pain/back, change in bowel habits, or bloody stools; Genito-Urinary: no dysuria, trouble voiding, or hematuria; Musculoskeletal: edema and pain; Neurological: no TIA or stroke symptoms; Psychiatric: no nervousness, anxiety or depression.    PHYSICAL EXAM:      Pulse: 76         General appearance:  Alert, well-appearing, and in no distress.  Oriented to person, place, and time                    Neurological: Normal speech, no focal findings noted; CN II - XII grossly intact. RLE with sensation to light touch, LLE with sensation to light touch.             Musculoskeletal: Digits/nail without cyanosis/clubbing.  Strength 5/5 BLE.                    Neck: Supple, no significant adenopathy                  Chest:  No wheezes, symmetric air entry. No use of accessory muscles               Cardiac: Normal rate and regular rhythm            Abdomen: Soft, nontender, nondistended      Extremities:   1+ R DP pulse, 1+ L DP pulse      1+ RLE edema, 1+ LLE edema    Skin:  RLE no ulcer; LLE no ulcer      RLE no spider veins, LLE no spider veins      RLE no varicose veins, LLE no varicose veins; discolored toe    CEAP 3/3    VCSS 18    LAB RESULTS:  No results found for: CBC  Lab Results   Component Value Date    LABPROT 10.0 01/06/2021    INR 1.0 01/06/2021     Lab Results   Component Value Date     01/06/2021    K 4.4 01/06/2021     01/06/2021    CO2 29 01/06/2021    GLU 92 01/06/2021    BUN 17 01/06/2021    CREATININE 0.87 01/06/2021    CALCIUM 9.5 01/06/2021    ANIONGAP 8 01/06/2021    EGFRNONAA >60.0 01/06/2021     Lab Results   Component Value Date    WBC 7.28 01/06/2021    RBC 3.94 (L) 01/06/2021    HGB 13.5 (L) 01/06/2021    HCT 39.6 (L) 01/06/2021     (H) 01/06/2021    MCH 34.3 (H) 01/06/2021    MCHC 34.1 01/06/2021    RDW 14.7 (H) 01/06/2021     01/06/2021    MPV 9.6 01/06/2021    GRAN 3.1 01/06/2021    GRAN 42.5 01/06/2021    LYMPH 3.2 01/06/2021    LYMPH 44.0 01/06/2021    MONO 0.5 01/06/2021    MONO 7.4 01/06/2021    EOS 0.4 01/06/2021    BASO 0.02 01/06/2021    EOSINOPHIL 5.5 01/06/2021    BASOPHIL 0.3 01/06/2021    DIFFMETHOD Automated 01/06/2021     .No results found for: HGBA1C    IMAGING:  All pertinent imaging has been reviewed and interpreted independently.      IMP/PLAN:  72 y.o. male with   Patient Active Problem List   Diagnosis    Neck pain on right side    Special screening for malignant neoplasms, colon    Weakness    Unsteady gait    Back pain    Decreased strength of lower extremity    Poor balance    being  managed by PCP and specialists who is here today for evaluation of BLE edema and pain.    -Venous insuff US  -ANNA  -recommend compression with Rx stockings, elevation, dietary changes associated with water and sodium intake discussed at length with patient  -Exercise   -RTC 1 month for further evaluation    I spent 11 minutes evaluating this patient and greater than 50% of the time was spent counseling, coordinator care and discussing the plan of care.  All questions were answered and patient stated understanding with agreement with the above treatment plan.    Efren Sanchez MD [facility]  Vascular and Endovascular Surgery

## 2022-02-28 ENCOUNTER — TELEPHONE (OUTPATIENT)
Dept: VASCULAR SURGERY | Facility: CLINIC | Age: 72
End: 2022-02-28
Payer: MEDICARE

## 2022-03-30 ENCOUNTER — OFFICE VISIT (OUTPATIENT)
Dept: VASCULAR SURGERY | Facility: CLINIC | Age: 72
End: 2022-03-30
Payer: MEDICARE

## 2022-03-30 ENCOUNTER — HOSPITAL ENCOUNTER (OUTPATIENT)
Dept: CARDIOLOGY | Facility: HOSPITAL | Age: 72
Discharge: HOME | End: 2022-03-30
Attending: SURGERY
Payer: MEDICARE

## 2022-03-30 VITALS
BODY MASS INDEX: 28.61 KG/M2 | WEIGHT: 204.38 LBS | SYSTOLIC BLOOD PRESSURE: 132 MMHG | DIASTOLIC BLOOD PRESSURE: 62 MMHG | HEIGHT: 71 IN

## 2022-03-30 DIAGNOSIS — R60.0 LEG EDEMA, LEFT: ICD-10-CM

## 2022-03-30 DIAGNOSIS — I82.411 DVT OF DEEP FEMORAL VEIN, RIGHT: Primary | ICD-10-CM

## 2022-03-30 DIAGNOSIS — L81.9 DISCOLORATION OF SKIN OF TOE: ICD-10-CM

## 2022-03-30 LAB
LEFT ABI: 1.29
LEFT ARM BP: 136 MMHG
LEFT DORSALIS PEDIS: 176 MMHG
LEFT GREAT SAPHENOUS DISTAL THIGH DIA: 0.3 CM
LEFT GREAT SAPHENOUS JUNCTION DIA: 0.5 CM
LEFT GREAT SAPHENOUS KNEE DIA: 0.5 CM
LEFT GREAT SAPHENOUS MIDDLE THIGH DIA: 0.3 CM
LEFT GREAT SAPHENOUS PROXIMAL CALF DIA: 0.3 CM
LEFT POSTERIOR TIBIAL: 171 MMHG
LEFT SMALL SAPHENOUS KNEE DIA: 0.2 CM
LEFT SMALL SAPHENOUS SPJ DIA: 0.1 CM
LEFT TBI: 0.46
LEFT TOE PRESSURE: 63 MMHG
RIGHT ABI: 1.24
RIGHT ARM BP: 135 MMHG
RIGHT DORSALIS PEDIS: 164 MMHG
RIGHT GREAT SAPHENOUS DISTAL THIGH DIA: 0.3 CM
RIGHT GREAT SAPHENOUS JUNCTION DIA: 0.6 CM
RIGHT GREAT SAPHENOUS KNEE DIA: 0.3 CM
RIGHT GREAT SAPHENOUS MIDDLE THIGH DIA: 0.3 CM
RIGHT GREAT SAPHENOUS PROXIMAL CALF DIA: 0.2 CM
RIGHT POSTERIOR TIBIAL: 168 MMHG
RIGHT SMALL SAPHENOUS KNEE DIA: 0.3 CM
RIGHT SMALL SAPHENOUS SPJ DIA: 0.2 CM
RIGHT TBI: 0.93
RIGHT TOE PRESSURE: 127 MMHG

## 2022-03-30 PROCEDURE — 99213 OFFICE O/P EST LOW 20 MIN: CPT | Mod: PBBFAC,25 | Performed by: SURGERY

## 2022-03-30 PROCEDURE — 99999 PR PBB SHADOW E&M-EST. PATIENT-LVL III: ICD-10-PCS | Mod: PBBFAC,,, | Performed by: SURGERY

## 2022-03-30 PROCEDURE — 93970 CV US LOWER VENOUS INSUFFICIENCY BILATERAL (CUPID ONLY): ICD-10-PCS | Mod: 26,,, | Performed by: SURGERY

## 2022-03-30 PROCEDURE — 93922 UPR/L XTREMITY ART 2 LEVELS: CPT

## 2022-03-30 PROCEDURE — 93970 EXTREMITY STUDY: CPT | Mod: 26,,, | Performed by: SURGERY

## 2022-03-30 PROCEDURE — 99214 PR OFFICE/OUTPT VISIT, EST, LEVL IV, 30-39 MIN: ICD-10-PCS | Mod: S$PBB,,, | Performed by: SURGERY

## 2022-03-30 PROCEDURE — 99214 OFFICE O/P EST MOD 30 MIN: CPT | Mod: S$PBB,,, | Performed by: SURGERY

## 2022-03-30 PROCEDURE — 93922 ANKLE BRACHIAL INDICES (ABI): ICD-10-PCS | Mod: 26,,, | Performed by: SURGERY

## 2022-03-30 PROCEDURE — 93970 EXTREMITY STUDY: CPT | Mod: TC

## 2022-03-30 PROCEDURE — 93922 UPR/L XTREMITY ART 2 LEVELS: CPT | Mod: 26,,, | Performed by: SURGERY

## 2022-03-30 PROCEDURE — 99999 PR PBB SHADOW E&M-EST. PATIENT-LVL III: CPT | Mod: PBBFAC,,, | Performed by: SURGERY

## 2022-03-30 NOTE — PROGRESS NOTES
Efren Sanchez MD, RPVI                                 [facility]                           [facility]                             2022    HPI:  Antione Torres is a 72 y.o. male with   Patient Active Problem List   Diagnosis    Neck pain on right side    Special screening for malignant neoplasms, colon    Weakness    Unsteady gait    Back pain    Decreased strength of lower extremity    Poor balance    being managed by PCP and specialists who is here today for evaluation of BLE pain and discoloration of toe.  Patient states location is BLE occurring for months.  Associated signs and symptoms include edema.  Quality is heavy and severity is 6/10.  Symptoms began mo ago.  Alleviating factors include elevation.  Worsening factors include dependency.  Patient is wearing compression stockings daily.  +FH of venous disease.  Symptoms do limit patient's functional status and daily activities.  no DVT history.  no venous interventions.  no low sodium diet.  no excessive water intake.    Migraine with aura: no  PFO/ASD/right to left shunt: no  Pregnant: no  Breastfeeding: no    no MI  no Stroke  Tobacco use: no    3/2022:  C/o redness and minimal edema.  Compliant with compression, elevation and diet.  Main complaint is paresthesias.      Past Medical History:   Diagnosis Date    Hyperlipidemia     Hypertension      Past Surgical History:   Procedure Laterality Date    CHOLECYSTECTOMY      COLONOSCOPY      COLONOSCOPY N/A 2016    Procedure: COLONOSCOPY;  Surgeon: Stanley Woo MD;  Location: [address]);  Service: Endoscopy;  Laterality: N/A;    HERNIA REPAIR      NECK SURGERY      PROSTATE SURGERY       No family history on file.  Social History     Socioeconomic History    Marital status:    Tobacco Use    Smoking status: Former Smoker     Quit date: 1999     Years since quittin.4    Smokeless tobacco: Never Used    Tobacco  comment: cigarettes   Substance and Sexual Activity    Alcohol use: Yes     Alcohol/week: 1.0 standard drink     Types: 1 Shots of liquor per week     Comment: moderate    Drug use: No    Sexual activity: Yes     Partners: Female       Current Outpatient Medications:     apixaban (ELIQUIS) 5 mg Tab, Take 5 mg by mouth 2 (two) times daily., Disp: , Rfl:     CRESTOR 5 mg tablet, , Disp: , Rfl:     CRESTOR 5 mg tablet, , Disp: , Rfl:     losartan (COZAAR) 50 MG tablet, Take 50 mg by mouth once daily., Disp: , Rfl:     meclizine (ANTIVERT) 25 mg tablet, , Disp: , Rfl:     methocarbamol (ROBAXIN) 750 MG Tab, Take 1 tablet (750 mg total) by mouth 3 (three) times daily., Disp: 15 tablet, Rfl: 0    multivitamin (THERAGRAN) per tablet, Take 1 tablet by mouth once daily., Disp: , Rfl:     naproxen (NAPROSYN) 500 MG tablet, , Disp: , Rfl:     NEXIUM 40 mg capsule, , Disp: , Rfl:     nystatin-triamcinolone (MYCOLOG II) cream, Apply topically 2 (two) times daily., Disp: 60 g, Rfl: 3    ranitidine (ZANTAC) 300 MG capsule, , Disp: , Rfl:     solifenacin (VESICARE) 10 MG tablet, Take 1 tablet (10 mg total) by mouth once daily., Disp: 90 tablet, Rfl: 3    REVIEW OF SYSTEMS:  General: No fevers or chills; ENT: No sore throat; Allergy and Immunology: no persistent infections; Hematological and Lymphatic: No history of bleeding or easy bruising; Endocrine: negative; Respiratory: no cough, shortness of breath, or wheezing; Cardiovascular: no chest pain or dyspnea on exertion; Gastrointestinal: no abdominal pain/back, change in bowel habits, or bloody stools; Genito-Urinary: no dysuria, trouble voiding, or hematuria; Musculoskeletal: edema and pain; Neurological: no TIA or stroke symptoms; Psychiatric: no nervousness, anxiety or depression.    PHYSICAL EXAM:                General appearance:  Alert, well-appearing, and in no distress.  Oriented to person, place, and time                    Neurological: Normal speech, no  focal findings noted; CN II - XII grossly intact. RLE with sensation to light touch, LLE with sensation to light touch.            Musculoskeletal: Digits/nail without cyanosis/clubbing.  Strength 5/5 BLE.                    Neck: Supple, no significant adenopathy                  Chest:  No wheezes, symmetric air entry. No use of accessory muscles               Cardiac: Normal rate and regular rhythm            Abdomen: Soft, nontender, nondistended      Extremities:   1+ R DP pulse, 1+ L DP pulse      1+ RLE edema, 1+ LLE edema    Skin:  RLE no ulcer; LLE no ulcer; redness to bilateral ankle/feet      RLE no spider veins, LLE no spider veins      RLE no varicose veins, LLE no varicose veins    CEAP 3/3    VCSS 18    LAB RESULTS:  No results found for: CBC  Lab Results   Component Value Date    LABPROT 10.0 01/06/2021    INR 1.0 01/06/2021     Lab Results   Component Value Date     01/06/2021    K 4.4 01/06/2021     01/06/2021    CO2 29 01/06/2021    GLU 92 01/06/2021    BUN 17 01/06/2021    CREATININE 0.87 01/06/2021    CALCIUM 9.5 01/06/2021    ANIONGAP 8 01/06/2021    EGFRNONAA >60.0 01/06/2021     Lab Results   Component Value Date    WBC 7.28 01/06/2021    RBC 3.94 (L) 01/06/2021    HGB 13.5 (L) 01/06/2021    HCT 39.6 (L) 01/06/2021     (H) 01/06/2021    MCH 34.3 (H) 01/06/2021    MCHC 34.1 01/06/2021    RDW 14.7 (H) 01/06/2021     01/06/2021    MPV 9.6 01/06/2021    GRAN 3.1 01/06/2021    GRAN 42.5 01/06/2021    LYMPH 3.2 01/06/2021    LYMPH 44.0 01/06/2021    MONO 0.5 01/06/2021    MONO 7.4 01/06/2021    EOS 0.4 01/06/2021    BASO 0.02 01/06/2021    EOSINOPHIL 5.5 01/06/2021    BASOPHIL 0.3 01/06/2021    DIFFMETHOD Automated 01/06/2021     .No results found for: HGBA1C    IMAGING:  All pertinent imaging has been reviewed and interpreted independently.    3/2022:  No significant arterial occlusive disease or venous reflux/DVT.    IMP/PLAN:  72 y.o. male with   Patient Active Problem  List   Diagnosis    Neck pain on right side    Special screening for malignant neoplasms, colon    Weakness    Unsteady gait    Back pain    Decreased strength of lower extremity    Poor balance    being managed by PCP and specialists who is here today for evaluation of BLE edema and pain.    -recommend compression with Rx stockings, elevation, dietary changes associated with water and sodium intake discussed at length with patient  -if discoloration does not improve will refer to Derm  -cont Neuro mgmt  -Exercise   -RTC 6 months for further evaluation    I spent 11 minutes evaluating this patient and greater than 50% of the time was spent counseling, coordinator care and discussing the plan of care.  All questions were answered and patient stated understanding with agreement with the above treatment plan.    Efren Sanchez MD [facility]  Vascular and Endovascular Surgery

## 2022-04-11 ENCOUNTER — OFFICE VISIT (OUTPATIENT)
Dept: HEMATOLOGY/ONCOLOGY | Facility: CLINIC | Age: 72
End: 2022-04-11
Payer: MEDICARE

## 2022-04-11 VITALS
OXYGEN SATURATION: 98 % | RESPIRATION RATE: 18 BRPM | DIASTOLIC BLOOD PRESSURE: 82 MMHG | WEIGHT: 204.81 LBS | HEART RATE: 80 BPM | BODY MASS INDEX: 28.67 KG/M2 | TEMPERATURE: 97 F | SYSTOLIC BLOOD PRESSURE: 134 MMHG | HEIGHT: 71 IN

## 2022-04-11 DIAGNOSIS — I82.411 DVT OF DEEP FEMORAL VEIN, RIGHT: ICD-10-CM

## 2022-04-11 PROCEDURE — 99204 PR OFFICE/OUTPT VISIT, NEW, LEVL IV, 45-59 MIN: ICD-10-PCS | Mod: S$PBB,,, | Performed by: INTERNAL MEDICINE

## 2022-04-11 PROCEDURE — 99213 OFFICE O/P EST LOW 20 MIN: CPT | Mod: PBBFAC,PO | Performed by: INTERNAL MEDICINE

## 2022-04-11 PROCEDURE — 99999 PR PBB SHADOW E&M-EST. PATIENT-LVL III: ICD-10-PCS | Mod: PBBFAC,,, | Performed by: INTERNAL MEDICINE

## 2022-04-11 PROCEDURE — 99999 PR PBB SHADOW E&M-EST. PATIENT-LVL III: CPT | Mod: PBBFAC,,, | Performed by: INTERNAL MEDICINE

## 2022-04-11 PROCEDURE — 99204 OFFICE O/P NEW MOD 45 MIN: CPT | Mod: S$PBB,,, | Performed by: INTERNAL MEDICINE

## 2022-04-11 RX ORDER — ZINC SULFATE 66 MG
TABLET ORAL
COMMUNITY

## 2022-04-11 RX ORDER — PAROXETINE 30 MG/1
30 TABLET, FILM COATED ORAL EVERY MORNING
COMMUNITY

## 2022-04-11 RX ORDER — AMLODIPINE BESYLATE 2.5 MG/1
2.5 TABLET ORAL DAILY
COMMUNITY
Start: 2022-02-15 | End: 2023-08-16

## 2022-04-11 RX ORDER — PRAZOSIN HYDROCHLORIDE 5 MG/1
1 CAPSULE ORAL NIGHTLY
COMMUNITY
Start: 2022-03-10

## 2022-04-11 RX ORDER — ATORVASTATIN CALCIUM 80 MG/1
80 TABLET, FILM COATED ORAL
COMMUNITY
Start: 2022-03-10

## 2022-04-11 RX ORDER — DEXTROMETHORPHAN HYDROBROMIDE, GUAIFENESIN 5; 100 MG/5ML; MG/5ML
LIQUID ORAL
COMMUNITY

## 2022-04-11 RX ORDER — OXYBUTYNIN CHLORIDE 5 MG/1
2 TABLET, EXTENDED RELEASE ORAL DAILY
COMMUNITY
Start: 2022-03-10 | End: 2023-08-16

## 2022-04-11 RX ORDER — CHOLECALCIFEROL (VITAMIN D3) 50 MCG
TABLET ORAL
COMMUNITY
Start: 2022-03-10

## 2022-04-11 RX ORDER — PANTOPRAZOLE SODIUM 40 MG/1
40 TABLET, DELAYED RELEASE ORAL
COMMUNITY
Start: 2022-03-10 | End: 2023-09-06 | Stop reason: ALTCHOICE

## 2022-04-11 NOTE — PROGRESS NOTES
"PATIENT: Antione Torres  MRN: [id number]  DATE: 4/11/2022    Chief Complaint:  History of right femoral vein DVT    Subjective:     History of Present Illness:     He has history of neuropathy and drop foot on the left side resulting in left foot swelling.    He also has swelling of the right foot on/off.    At 1 point he was pretty depressed and immobile, staying in bed a lot at which time a proximal right femoral vein DVT was noted on ultrasound January 2021.    The DVT persisted on a ultrasound done in April 2021.    Then he started to become more active, mobile and another ultrasound from February 2020 to showed resolution of the prior DVT.    He is interested in coming off the blood thinners and is here for an evaluation. Accompanied by wife.  He is active now and is doing some exercises.    No prior history of DVTs, no family history of DVTs.    Past Medical, Surgical, Family and Social History Reviewed.    Medications and Allergies reviewed    Review of Systems   Constitutional: Negative for fever and unexpected weight change.         Objective:     Vitals:    04/11/22 1517   BP: 134/82   BP Location: Right arm   Patient Position: Sitting   BP Method: Medium (Automatic)   Pulse: 80   Resp: 18   Temp: 97.2 °F (36.2 °C)   TempSrc: Oral   SpO2: 98%   Weight: 92.9 kg (204 lb 12.9 oz)   Height: 5' 11" (1.803 m)       BMI: Body mass index is 28.56 kg/m².    Physical Exam  Vitals and nursing note reviewed.   Constitutional:       General: He is not in acute distress.  Pulmonary:      Effort: Pulmonary effort is normal.      Breath sounds: Normal breath sounds.   Neurological:      Mental Status: He is alert. Mental status is at baseline.         Assessment:       1. DVT of deep femoral vein, right        Plan:   -his right femoral vein DVT is provoked by immobility  -it is now resolved  -low probability for hypercoagulable state  -will hold off on pursuing a hypercoagulable workup  -okay to discontinue Eliquis  -continue " aspirin 81 mg q.d.  -he understands that his DVT risk would increase if he becomes less active/immobile once again    All questions answered    Return to clinic as needed

## 2022-04-28 ENCOUNTER — TELEPHONE (OUTPATIENT)
Dept: NEUROLOGY | Facility: CLINIC | Age: 72
End: 2022-04-28
Payer: MEDICARE

## 2022-04-28 NOTE — TELEPHONE ENCOUNTER
----- Message from Caesar Thomas sent at 4/28/2022  1:18 PM CDT -----  Contact: Ruth  Who Called: PT  Regarding: Ruth the pt's wife called stating that the pt need to have a second opinion and genetic testing and is specifically requesting to be scheduled with the preferred provider. She is requesting a callback.   Would the patient rather a call back or a response via [facility]? Call back  Best Call Back Number: [phone]  Additional Information:

## 2022-04-29 ENCOUNTER — TELEPHONE (OUTPATIENT)
Dept: NEUROLOGY | Facility: CLINIC | Age: 72
End: 2022-04-29
Payer: MEDICARE

## 2022-04-29 NOTE — TELEPHONE ENCOUNTER
----- Message from Chanelle Mohan MA sent at 4/29/2022  2:48 PM CDT -----  Regarding: Neuro Referral  Patient needs to see someone patient has been ruled out for CIPD, MS. Has had LP and multiple test done. Wife looking for help to make sure he get with the correct provider.      Chanelle

## 2022-04-29 NOTE — TELEPHONE ENCOUNTER
Called and spoke with pts wife. Offered 7 day release with  Dr. Nuñez on 05/25/22 at 1pm.  Accepted offer. Will schedule once released.

## 2022-05-24 ENCOUNTER — TELEPHONE (OUTPATIENT)
Dept: NEUROLOGY | Facility: CLINIC | Age: 72
End: 2022-05-24
Payer: MEDICARE

## 2022-05-25 ENCOUNTER — OFFICE VISIT (OUTPATIENT)
Dept: NEUROLOGY | Facility: CLINIC | Age: 72
End: 2022-05-25
Payer: MEDICARE

## 2022-05-25 VITALS
HEART RATE: 71 BPM | SYSTOLIC BLOOD PRESSURE: 153 MMHG | WEIGHT: 205 LBS | BODY MASS INDEX: 28.7 KG/M2 | DIASTOLIC BLOOD PRESSURE: 73 MMHG | HEIGHT: 71 IN

## 2022-05-25 DIAGNOSIS — R29.898 DECREASED STRENGTH OF LOWER EXTREMITY: ICD-10-CM

## 2022-05-25 DIAGNOSIS — R26.81 UNSTEADY GAIT: ICD-10-CM

## 2022-05-25 DIAGNOSIS — R26.89 POOR BALANCE: Primary | ICD-10-CM

## 2022-05-25 DIAGNOSIS — G61.81 CIDP (CHRONIC INFLAMMATORY DEMYELINATING POLYNEUROPATHY): ICD-10-CM

## 2022-05-25 PROCEDURE — 99204 PR OFFICE/OUTPT VISIT, NEW, LEVL IV, 45-59 MIN: ICD-10-PCS | Mod: S$PBB,,, | Performed by: NEUROMUSCULOSKELETAL MEDICINE & OMM

## 2022-05-25 PROCEDURE — 99204 OFFICE O/P NEW MOD 45 MIN: CPT | Mod: S$PBB,,, | Performed by: NEUROMUSCULOSKELETAL MEDICINE & OMM

## 2022-05-25 PROCEDURE — 99999 PR PBB SHADOW E&M-EST. PATIENT-LVL III: CPT | Mod: PBBFAC,,, | Performed by: NEUROMUSCULOSKELETAL MEDICINE & OMM

## 2022-05-25 PROCEDURE — 99213 OFFICE O/P EST LOW 20 MIN: CPT | Mod: PBBFAC,PO | Performed by: NEUROMUSCULOSKELETAL MEDICINE & OMM

## 2022-05-25 PROCEDURE — 99999 PR PBB SHADOW E&M-EST. PATIENT-LVL III: ICD-10-PCS | Mod: PBBFAC,,, | Performed by: NEUROMUSCULOSKELETAL MEDICINE & OMM

## 2022-05-25 RX ORDER — EZETIMIBE 10 MG/1
10 TABLET ORAL DAILY
COMMUNITY

## 2022-05-25 RX ORDER — AZATHIOPRINE 50 MG/1
50 TABLET ORAL DAILY
Qty: 30 TABLET | Refills: 4 | Status: SHIPPED | OUTPATIENT
Start: 2022-05-25 | End: 2022-07-11 | Stop reason: SDUPTHER

## 2022-05-25 NOTE — PROGRESS NOTES
This note was dictated with Modal Fluency a word recognition program. There are occasionally word recognition errors which are missed on review.  Antione Torres  1950  Review of patient's allergies indicates:   Allergen Reactions    Pcn [penicillins] Hives, Swelling and Rash     [unfilled]    Past Medical History:   Diagnosis Date    Hyperlipidemia     Hypertension      Social History     Socioeconomic History    Marital status:    Tobacco Use    Smoking status: Former Smoker     Quit date: 1999     Years since quittin.5    Smokeless tobacco: Never Used    Tobacco comment: cigarettes   Substance and Sexual Activity    Alcohol use: Yes     Alcohol/week: 1.0 standard drink     Types: 1 Shots of liquor per week     Comment: moderate    Drug use: No    Sexual activity: Yes     Partners: Female     No family history on file.    Review of systems:  Constitutional-negative  Eyes-negative  ENT, mouth-negative  Cardiovascular-negative  Respiratory-negative  GI-negative  - negative  Musculoskeletal-negative  Skin-negative  Neurologic-negative  Psychiatric-negative  Endocrine-negative  Hematology/lymph nodes-negative  Allergies/immunology-negative  Antione Torres  1950  Review of patient's allergies indicates:   Allergen Reactions    Pcn [penicillins] Hives, Swelling and Rash     [unfilled]    Past Medical History:   Diagnosis Date    Hyperlipidemia     Hypertension      Social History     Socioeconomic History    Marital status:    Tobacco Use    Smoking status: Former Smoker     Quit date: 1999     Years since quittin.5    Smokeless tobacco: Never Used    Tobacco comment: cigarettes   Substance and Sexual Activity    Alcohol use: Yes     Alcohol/week: 1.0 standard drink     Types: 1 Shots of liquor per week     Comment: moderate    Drug use: No    Sexual activity: Yes     Partners: Female     No family history on file.    Review of  systems:  Constitutional-negative  Eyes-negative  ENT, mouth-negative  Cardiovascular-negative  Respiratory-negative  GI-negative  - negative  Musculoskeletal-negative  Skin-negative  Neurologic-negative  Psychiatric-negative  Endocrine-negative  Hematology/lymph nodes-negative  Allergies/immunology-negative  Gen. Appearance: Well-developed with no obvious deformitieshe is group  Carotid arteries symmetrical pulses  Peripheral vascular shows symmetrical pulses with no obvious edema or tenderness  Social History :  Patient presents with his wife with a history of left footdrop; he is seen multiple neurologists including Dr. Pollard ; Dr. Leger  Present history:   This is a 72-year-old white male who presents for a 3rd opinion in terms of peripheral nerve disease.  He is here for question of hereditary/genetic diagnosis with an aunt and cousin having some neuropathy.  He has been diagnosed with CIDP with IVIG infusions.  He has had a sensory/sural nerve biopsy, EMG and spinal tap.  Spinal fluid was borderline; EMG/nerve conductions showed evidence of peripheral neuropathy; Sural nerve biopsy was normal.  Patient is not improved over the last 3 years and persist with a left footdrop wearing a brace and developing a right foot drop.  He also has proximal leg weakness.  He had neck surgery 5 years ago.  He started physical therapy about 2 months ago.  He uses a walker for balance and leg weakness.  He has been on high dose prednisone in the past with no improvement.  He has no known respiratory problem is able to count to 33 on 1 breath.    Neurological Exam:  Mental status-alert and oriented to person, place, and time; attention span and concentration is good. Fund of knowledge-patient is aware of current events and able to give detailed history of the current problem.recent and remote memory seems intact. Language function is normal with no evidence of aphasia  Cranial nerves:Visual acuity to hand chart -normal;  visual fields to confrontation normal;pupils were equal and reactive to light ;no evidence of ptosis ;  funduscopic examination was normal with sharp disc margins. external ocular movements were full with no nystagmus. Facial sensation to pinprick : normal ; corneal reflexes intact; Facial muscles were symmetrical. Hearing is unimpaired symmetrical finger rub; Tongue movements - normal ; palate movements - normal ;Swallowing unimpaired. Shoulder shrug was intact with good strength Speech was normal  Motor examination: Upper :  Bilateral interosseous muscle weakness with preservation of thenar muscles                                   Lower extremities - proximal leg weakness with inability to stand from a chair without using his hands.;muscle tone was normal ; left foot drop with a brace; right foot drop showing significant weakness.                  Right-handed  Sensory examination:   Upper; normal pinprick and soft touch ;   Lower extremities -reports normal sensory in the feet.  Vibration sense:  Vibration is less than 5 seconds.  @ toes  Deep tendon reflexes: upper extremities :1-2+ symmetrical ;     lower extremities KJ- 1-2 +; AJ - absent Both plantar responses were flexor  Cerebellar examination upper: Normal finger to nose and rapid alternating movements  Gait:  Unsteady unable to walk without walker;      heel and toe walk normal  Romberg test:  Positive the      Tandem gait: Normal    Involuntary movements: Negative  TMJ - no tenderness  Cervical examination: Full range of motion with no pain Cervical tenderness :negative  Lumbar examination: Low back tenderness-negative                  Sciatic notchtenderness-negative            Straight leg raising : negative    Impression:  CIDP predominantly motor; history cervical disc disease with fusion; left greater than right footdrop; proximal leg weakness; bilateral interosseous weakness; normal sural sensory nerve biopsy; borderline spinal fluid; EMG nerve  study shows peripheral neuropathy-Results not available; no history of diabetes although father was diabetic.    Recommendations/Plan :  Long discussion with the patient and wife in terms of diagnosis.  The fact that he has positive symptoms with numbness and occasional burning is more in favor of CIDP rather than hereditary neuropathy.  He has been on prednisone a ready with no results so would suggest azathioprine starting at 50 mg at night with management of liver functions.  He has no evidence of respiratory involvement.  Will follow-up in 2 months.

## 2022-06-19 ENCOUNTER — PATIENT MESSAGE (OUTPATIENT)
Dept: UROLOGY | Facility: CLINIC | Age: 72
End: 2022-06-19
Payer: MEDICARE

## 2022-06-30 ENCOUNTER — OFFICE VISIT (OUTPATIENT)
Dept: NEUROLOGY | Facility: CLINIC | Age: 72
End: 2022-06-30
Payer: MEDICARE

## 2022-06-30 VITALS
WEIGHT: 203.06 LBS | HEIGHT: 71 IN | HEART RATE: 62 BPM | DIASTOLIC BLOOD PRESSURE: 73 MMHG | SYSTOLIC BLOOD PRESSURE: 145 MMHG | BODY MASS INDEX: 28.43 KG/M2

## 2022-06-30 DIAGNOSIS — R26.81 UNSTEADY GAIT: ICD-10-CM

## 2022-06-30 DIAGNOSIS — G61.81 CIDP (CHRONIC INFLAMMATORY DEMYELINATING POLYNEUROPATHY): Primary | ICD-10-CM

## 2022-06-30 PROCEDURE — 99214 PR OFFICE/OUTPT VISIT, EST, LEVL IV, 30-39 MIN: ICD-10-PCS | Mod: S$PBB,,, | Performed by: NEUROMUSCULOSKELETAL MEDICINE & OMM

## 2022-06-30 PROCEDURE — 99999 PR PBB SHADOW E&M-EST. PATIENT-LVL III: CPT | Mod: PBBFAC,,, | Performed by: NEUROMUSCULOSKELETAL MEDICINE & OMM

## 2022-06-30 PROCEDURE — 99214 OFFICE O/P EST MOD 30 MIN: CPT | Mod: S$PBB,,, | Performed by: NEUROMUSCULOSKELETAL MEDICINE & OMM

## 2022-06-30 PROCEDURE — 99213 OFFICE O/P EST LOW 20 MIN: CPT | Mod: PBBFAC,PO | Performed by: NEUROMUSCULOSKELETAL MEDICINE & OMM

## 2022-06-30 PROCEDURE — 99999 PR PBB SHADOW E&M-EST. PATIENT-LVL III: ICD-10-PCS | Mod: PBBFAC,,, | Performed by: NEUROMUSCULOSKELETAL MEDICINE & OMM

## 2022-06-30 NOTE — PROGRESS NOTES
Social History :  Patient presents with his wife on the phone with a history of left footdrop; he is seen multiple neurologists including Dr. Pollard ; Dr. Leger  Present history:   This is a 72-year-old white male who presents for a 3rd opinion in terms of peripheral nerve disease.  He is here for question of hereditary/genetic diagnosis with an aunt and cousin having some neuropathy.  He has been diagnosed with CIDP with IVIG infusions.  He has had a sensory/sural nerve biopsy, EMG and spinal tap.  Spinal fluid was borderline; EMG/nerve conductions showed evidence of peripheral neuropathy; Sural nerve biopsy was normal.  Patient is not improved over the last 3 years and persist with a left footdrop wearing a brace and developing a right foot drop.  He also has proximal leg weakness.  He had neck surgery 5 years ago.  He started physical therapy about 2 months ago.  He uses a walker for balance and leg weakness.  He has been on high dose prednisone in the past with no improvement.  He has no known respiratory problem is able to count to 33 on 1 breath.     Neurological Exam:  Mental status-alert and oriented to person, place, and time; attention span and concentration is good. Fund of knowledge-patient is aware of current events and able to give detailed history of the current problem.recent and remote memory seems intact. Language function is normal with no evidence of aphasia  Cranial nerves:Visual acuity to hand chart -normal; visual fields to confrontation normal;pupils were equal and reactive to light ;no evidence of ptosis ;  funduscopic examination was normal with sharp disc margins. external ocular movements were full with no nystagmus. Facial sensation to pinprick : normal ; corneal reflexes intact; Facial muscles were symmetrical. Hearing is unimpaired symmetrical finger rub; Tongue movements - normal ; palate movements - normal ;Swallowing unimpaired. Shoulder shrug was intact with good strength  Speech was normal  Motor examination: Upper :  Bilateral interosseous muscle weakness with preservation of thenar muscles                                   Lower extremities - proximal leg weakness with inability to stand from a chair without using his hands.;muscle tone was normal ; left foot drop with a brace; right foot drop showing significant weakness.                  Right-handed  Sensory examination:   Upper; normal pinprick and soft touch ;   Lower extremities -reports normal sensory in the feet.  Vibration sense:  Vibration is less than 5 seconds.  @ toes  Deep tendon reflexes: upper extremities :1-2+ symmetrical ;     lower extremities KJ- 1-2 +; AJ - absent Both plantar responses were flexor  Cerebellar examination upper: Normal finger to nose and rapid alternating movements  Gait:  Unsteady unable to walk without walker;      heel and toe walk normal  Romberg test:  Positive the      Tandem gait: Normal    Involuntary movements: Negative  TMJ - no tenderness  Cervical examination: Full range of motion with no pain Cervical tenderness :negative  Lumbar examination: Low back tenderness-negative                  Sciatic notchtenderness-negative            Straight leg raising : negative     Impression:  CIDP predominantly motor; history cervical disc disease with fusion; left greater than right footdrop; proximal leg weakness; bilateral interosseous weakness; normal sural sensory nerve biopsy; borderline spinal fluid; EMG nerve study shows peripheral neuropathy-Results not available; no history of diabetes although father was diabetic.     Recommendations/Plan :  Long discussion with the patient and wife in terms of diagnosis.  The fact that he has positive symptoms with numbness and occasional burning is more in favor of CIDP rather than hereditary neuropathy.  He has been on prednisone a ready with no results so would suggest azathioprine increasing 50 mg BID with  management of liver functions.  He has  no evidence of respiratory involvement.  Patient relates using medical marijuana to help pain however is helping PTSD  Will follow-up in 2 months.

## 2022-07-06 ENCOUNTER — PATIENT MESSAGE (OUTPATIENT)
Dept: UROLOGY | Facility: CLINIC | Age: 72
End: 2022-07-06
Payer: MEDICARE

## 2022-07-07 ENCOUNTER — TELEPHONE (OUTPATIENT)
Dept: NEUROLOGY | Facility: CLINIC | Age: 72
End: 2022-07-07
Payer: MEDICARE

## 2022-07-07 ENCOUNTER — PATIENT MESSAGE (OUTPATIENT)
Dept: NEUROLOGY | Facility: CLINIC | Age: 72
End: 2022-07-07
Payer: MEDICARE

## 2022-07-07 DIAGNOSIS — G61.81 CIDP (CHRONIC INFLAMMATORY DEMYELINATING POLYNEUROPATHY): Primary | ICD-10-CM

## 2022-07-07 NOTE — TELEPHONE ENCOUNTER
----- Message from Lilian Cantu sent at 7/7/2022  9:19 AM CDT -----  Contact: Patient Wife  RX REFILL REQUEST: azaTHIOprine (IMURAN) 50 mg Tab    PH[facility]Y : [id number] - [place] - [id number]    PATIENT WIFE @[phone] (home)

## 2022-07-11 ENCOUNTER — TELEPHONE (OUTPATIENT)
Dept: NEUROLOGY | Facility: CLINIC | Age: 72
End: 2022-07-11
Payer: MEDICARE

## 2022-07-11 RX ORDER — AZATHIOPRINE 50 MG/1
50 TABLET ORAL 2 TIMES DAILY
Qty: 30 TABLET | Refills: 5 | Status: SHIPPED | OUTPATIENT
Start: 2022-07-11 | End: 2022-10-04 | Stop reason: SDUPTHER

## 2022-07-11 NOTE — TELEPHONE ENCOUNTER
----- Message from Nery Keen sent at 7/11/2022  9:23 AM CDT -----  Regarding: Refill  Contact: pt @ [phone]  Rx Refill/Request    Is this a Refill or New Rx:Refill    Rx Name and Strength:azaTHIOprine (IMURAN) 50 mg Tab    Preferred Pharmacy with phone number:  [id number] - [place] - [id number]  [place] [address]  Phone: [phone] Fax: [phone]    Communication Preference:pt @ [phone]    Additional Information:pt will be out of his medication in 2 days

## 2022-08-15 ENCOUNTER — OFFICE VISIT (OUTPATIENT)
Dept: UROLOGY | Facility: CLINIC | Age: 72
End: 2022-08-15
Payer: MEDICARE

## 2022-08-15 VITALS
DIASTOLIC BLOOD PRESSURE: 74 MMHG | HEART RATE: 71 BPM | HEIGHT: 71 IN | WEIGHT: 205.19 LBS | BODY MASS INDEX: 28.73 KG/M2 | SYSTOLIC BLOOD PRESSURE: 148 MMHG

## 2022-08-15 DIAGNOSIS — R35.0 URINARY FREQUENCY: ICD-10-CM

## 2022-08-15 DIAGNOSIS — Z92.3 HISTORY OF EXTERNAL BEAM RADIATION THERAPY: ICD-10-CM

## 2022-08-15 DIAGNOSIS — N52.31 ERECTILE DYSFUNCTION FOLLOWING RADICAL PROSTATECTOMY: ICD-10-CM

## 2022-08-15 DIAGNOSIS — N32.81 URGENCY-FREQUENCY SYNDROME: ICD-10-CM

## 2022-08-15 DIAGNOSIS — C61 PROSTATE CANCER: ICD-10-CM

## 2022-08-15 DIAGNOSIS — Z90.79 HISTORY OF RADICAL RETROPUBIC PROSTATECTOMY: ICD-10-CM

## 2022-08-15 PROCEDURE — 99214 OFFICE O/P EST MOD 30 MIN: CPT | Mod: PBBFAC | Performed by: NURSE PRACTITIONER

## 2022-08-15 PROCEDURE — 99214 PR OFFICE/OUTPT VISIT, EST, LEVL IV, 30-39 MIN: ICD-10-PCS | Mod: S$PBB,,, | Performed by: NURSE PRACTITIONER

## 2022-08-15 PROCEDURE — 99214 OFFICE O/P EST MOD 30 MIN: CPT | Mod: S$PBB,,, | Performed by: NURSE PRACTITIONER

## 2022-08-15 PROCEDURE — 99999 PR PBB SHADOW E&M-EST. PATIENT-LVL IV: CPT | Mod: PBBFAC,,, | Performed by: NURSE PRACTITIONER

## 2022-08-15 PROCEDURE — 99999 PR PBB SHADOW E&M-EST. PATIENT-LVL IV: ICD-10-PCS | Mod: PBBFAC,,, | Performed by: NURSE PRACTITIONER

## 2022-08-15 RX ORDER — MIRABEGRON 25 MG/1
25 TABLET, FILM COATED, EXTENDED RELEASE ORAL DAILY
Qty: 90 TABLET | Refills: 3 | Status: SHIPPED | OUTPATIENT
Start: 2022-08-15 | End: 2023-07-10

## 2022-08-15 RX ORDER — SOLIFENACIN SUCCINATE 10 MG/1
10 TABLET, FILM COATED ORAL DAILY
Qty: 90 TABLET | Refills: 3 | Status: SHIPPED | OUTPATIENT
Start: 2022-08-15 | End: 2023-08-16

## 2022-08-15 NOTE — PROGRESS NOTES
CHIEF COMPLAINT:    Antione Torres is a 72 y.o. male presents today for Prostate Cancer.     HISTORY OF PRESENTING ILLINESS:    Antione Torres is a 72 y.o. male with a history of prostate cancer; s/p RRP 2001 and XRT 2 years later.  He was initially followed with Dr. Yun. Last seen in clinic with Dr. Yun 2/12/2014.  He has been coming in once a year for Prostate Cancer and urinary frequency. PSA's stable.  Last seen in clinic with me on 12/01/2021 due to microscopic hematuria, but did not warrant a work up.    Here today for annual visit.  Last PSA 0.06    (+) urinary frequency. Had been taking Vesciare 10mg daily but [facility] oxy[facility]ynin 5mg XL.  Helps with frequency but reports dry eyes and some constipation.   No longer using JEOVANNY.     He uses JEOVANNY for ED.  States he his neuropathy and LE weakness is progressing.   Has fallen; using walker for balance.                        REVIEW OF SYSTEMS:  Review of Systems   Constitutional: Negative.  Negative for chills and fever.   Eyes: Negative for double vision.   Respiratory: Negative for cough and shortness of breath.    Cardiovascular: Negative for chest pain and palpitations.   Gastrointestinal: Negative for nausea and vomiting.   Genitourinary: Positive for frequency. Negative for dysuria, flank pain and hematuria.   Neurological: Positive for focal weakness and weakness. Negative for dizziness.         PATIENT HISTORY:    Past Medical History:   Diagnosis Date    Hyperlipidemia     Hypertension        Past Surgical History:   Procedure Laterality Date    CHOLECYSTECTOMY      COLONOSCOPY      COLONOSCOPY N/A 7/25/2016    Procedure: COLONOSCOPY;  Surgeon: Stanley Woo MD;  Location: [address];  Service: Endoscopy;  Laterality: N/A;    HERNIA REPAIR      NECK SURGERY      PROSTATE SURGERY         History reviewed. No pertinent family history.    Social History     Socioeconomic History    Marital status:     Tobacco Use    Smoking status: Former Smoker     Quit date: 1999     Years since quittin.7    Smokeless tobacco: Never Used    Tobacco comment: cigarettes   Substance and Sexual Activity    Alcohol use: Yes     Alcohol/week: 1.0 standard drink     Types: 1 Shots of liquor per week     Comment: moderate    Drug use: No    Sexual activity: Yes     Partners: Female       Allergies:  Pcn [penicillins]    Medications:    Current Outpatient Medications:     acetaminophen (TYLENOL) 650 MG TbSR, 2 tablets as needed, Disp: , Rfl:     amLODIPine (NORVASC) 2.5 MG tablet, Take 2.5 mg by mouth once daily., Disp: , Rfl:     atorvastatin (LIPITOR) 80 MG tablet, 80 mg., Disp: , Rfl:     azaTHIOprine (IMURAN) 50 mg Tab, Take 1 tablet (50 mg total) by mouth 2 (two) times daily., Disp: 30 tablet, Rfl: 5    cannabidioL (EPIDIOLEX) 100 mg/mL, as directed, Disp: , Rfl:     cholecalciferol, vitamin D3, (VITAMIN D3) 50 mcg (2,000 unit) Tab, TAKE 2000UNIT BY MOUTH ONCE DAILY FOR VITAMIN D.  TAKE WITH FOOD., Disp: , Rfl:     CRESTOR 5 mg tablet, , Disp: , Rfl:     CRESTOR 5 mg tablet, , Disp: , Rfl:     ezetimibe (ZETIA) 10 mg tablet, Take 10 mg by mouth once daily., Disp: , Rfl:     losartan (COZAAR) 50 MG tablet, Take 50 mg by mouth once daily., Disp: , Rfl:     meclizine (ANTIVERT) 25 mg tablet, , Disp: , Rfl:     methocarbamol (ROBAXIN) 750 MG Tab, Take 1 tablet (750 mg total) by mouth 3 (three) times daily., Disp: 15 tablet, Rfl: 0    multivitamin (THERAGRAN) per tablet, Take 1 tablet by mouth once daily., Disp: , Rfl:     naproxen (NAPROSYN) 500 MG tablet, , Disp: , Rfl:     NEXIUM 40 mg capsule, , Disp: , Rfl:     nystatin-triamcinolone (MYCOLOG II) cream, Apply topically 2 (two) times daily., Disp: 60 g, Rfl: 3    oxybutynin (DITROPAN-XL) 5 MG TR24, Take 2 tablets by mouth once daily., Disp: , Rfl:     pantoprazole (PROTONIX) 40 MG tablet, 40 mg., Disp: , Rfl:     paroxetine (PAXIL) 30 MG tablet,  Take 30 mg by mouth every morning., Disp: , Rfl:     prazosin (MINIPRESS) 5 MG capsule, Take 1 capsule by mouth every evening., Disp: , Rfl:     ranitidine (ZANTAC) 300 MG capsule, , Disp: , Rfl:     zinc sulfate (ZINC-15) 66 mg Tab, 2 tablets, Disp: , Rfl:     mirabegron (MYRBETRIQ) 25 mg Tb24 ER tablet, Take 1 tablet (25 mg total) by mouth once daily., Disp: 90 tablet, Rfl: 3    solifenacin (VESICARE) 10 MG tablet, Take 1 tablet (10 mg total) by mouth once daily., Disp: 90 tablet, Rfl: 3    PHYSICAL EXAMINATION:  Physical Exam  Vitals and nursing note reviewed.   Constitutional:       General: He is awake.      Appearance: Normal appearance.   HENT:      Head: Normocephalic.      Right Ear: External ear normal.      Left Ear: External ear normal.      Nose: Nose normal.   Cardiovascular:      Rate and Rhythm: Normal rate.   Pulmonary:      Effort: Pulmonary effort is normal. No respiratory distress.   Abdominal:      Tenderness: There is no abdominal tenderness. There is no right CVA tenderness or left CVA tenderness.   Musculoskeletal:         General: Normal range of motion.      Cervical back: Normal range of motion.   Skin:     General: Skin is warm and dry.   Neurological:      General: No focal deficit present.      Mental Status: He is alert and oriented to person, place, and time.   Psychiatric:         Mood and Affect: Mood normal.         Behavior: Behavior is cooperative.           LABS:        Lab Results   Component Value Date    PSA 0.01 04/30/2013    PSA <0.010 10/31/2012    PSA <0.010 04/13/2012    PSADIAG 0.06 08/08/2022    PSADIAG 0.08 07/30/2021    PSADIAG 0.05 08/04/2020             IMPRESSION:    Encounter Diagnoses   Name Primary?    Prostate cancer     Erectile dysfunction following radical prostatectomy     History of external beam radiation therapy     Urinary frequency     Urgency-frequency syndrome     History of radical retropubic prostatectomy          Assessment:       1.  Prostate cancer    2. Erectile dysfunction following radical prostatectomy    3. History of external beam radiation therapy    4. Urinary frequency    5. Urgency-frequency syndrome    6. History of radical retropubic prostatectomy        Plan:         I spent 30 minutes with the patient of which more than half was spent in direct consultation with the patient in regards to our treatment and plan.  We addressed the office findings and recent labs.   Education and recommendations of today's plan of care including home remedies and needed follow up with PCP.   We discussed the chief complaint/LUTS and the possible contributory factors.   Recommended lifestyle modifications with proper, healthy diet, good hydration if no fluid restrictions; reducing bladder irritants.   Benefits of regular exercise approved by PCP.  Recommend stopping the oxybutynin due to the side effects/risks for falls.  Rx for myrbetriq 25mg daily.  We reviewed new meds; discussed the reason, benefits, expectations as well as risks, possible side effects.   Any concerns then stop taking and let me know.  Discussed other options for ED; ok of now.  RTC 1 year

## 2022-08-25 ENCOUNTER — OFFICE VISIT (OUTPATIENT)
Dept: NEUROLOGY | Facility: CLINIC | Age: 72
End: 2022-08-25
Payer: MEDICARE

## 2022-08-25 VITALS
BODY MASS INDEX: 28.43 KG/M2 | SYSTOLIC BLOOD PRESSURE: 157 MMHG | DIASTOLIC BLOOD PRESSURE: 71 MMHG | WEIGHT: 203.06 LBS | HEART RATE: 70 BPM | HEIGHT: 71 IN

## 2022-08-25 DIAGNOSIS — M21.372 LEFT FOOT DROP: ICD-10-CM

## 2022-08-25 PROCEDURE — 99999 PR PBB SHADOW E&M-EST. PATIENT-LVL III: CPT | Mod: PBBFAC,,, | Performed by: NEUROMUSCULOSKELETAL MEDICINE & OMM

## 2022-08-25 PROCEDURE — 99499 UNLISTED E&M SERVICE: CPT | Mod: S$PBB,,, | Performed by: PSYCHIATRY & NEUROLOGY

## 2022-08-25 PROCEDURE — 99213 OFFICE O/P EST LOW 20 MIN: CPT | Mod: PBBFAC,PO | Performed by: NEUROMUSCULOSKELETAL MEDICINE & OMM

## 2022-10-03 ENCOUNTER — TELEPHONE (OUTPATIENT)
Dept: NEUROLOGY | Facility: CLINIC | Age: 72
End: 2022-10-03
Payer: MEDICARE

## 2022-10-05 DIAGNOSIS — G61.81 CIDP (CHRONIC INFLAMMATORY DEMYELINATING POLYNEUROPATHY): Primary | ICD-10-CM

## 2022-10-10 ENCOUNTER — TELEPHONE (OUTPATIENT)
Dept: NEUROLOGY | Facility: CLINIC | Age: 72
End: 2022-10-10
Payer: MEDICARE

## 2022-10-10 NOTE — TELEPHONE ENCOUNTER
----- Message from Neeru Padilla sent at 10/10/2022  3:38 PM CDT -----  Contact: [phone] wife jayjay  the patient is calling to get scheduled for a appt.  Pt stated had a appt for October but I don't see one on the chart nor a cancelled one.  Pt access tried but no appts are available. Until January   the patient can be reached at. [phone] wife jayjay

## 2022-10-13 NOTE — TELEPHONE ENCOUNTER
Message from 10/10/22    Called patient with no answer.  Patient has a scheduled appointment in the system for 10/21/22.  I will mail him a copy and send it to the address in his chart.

## 2022-10-21 ENCOUNTER — LAB VISIT (OUTPATIENT)
Dept: LAB | Facility: HOSPITAL | Age: 72
End: 2022-10-21
Payer: MEDICARE

## 2022-10-21 ENCOUNTER — OFFICE VISIT (OUTPATIENT)
Dept: NEUROLOGY | Facility: CLINIC | Age: 72
End: 2022-10-21
Payer: MEDICARE

## 2022-10-21 VITALS
WEIGHT: 201.06 LBS | BODY MASS INDEX: 28.15 KG/M2 | HEIGHT: 71 IN | DIASTOLIC BLOOD PRESSURE: 83 MMHG | SYSTOLIC BLOOD PRESSURE: 143 MMHG | HEART RATE: 90 BPM

## 2022-10-21 DIAGNOSIS — G62.9 POLYNEUROPATHY: ICD-10-CM

## 2022-10-21 DIAGNOSIS — M21.372 LEFT FOOT DROP: ICD-10-CM

## 2022-10-21 DIAGNOSIS — M21.371 BILATERAL FOOT-DROP: Primary | ICD-10-CM

## 2022-10-21 DIAGNOSIS — M21.372 BILATERAL FOOT-DROP: Primary | ICD-10-CM

## 2022-10-21 PROBLEM — I10 HYPERTENSION: Status: ACTIVE | Noted: 2022-10-21

## 2022-10-21 PROBLEM — G47.9 SLEEP DISORDER, UNSPECIFIED: Status: ACTIVE | Noted: 2022-10-21

## 2022-10-21 PROBLEM — Z90.49 HISTORY OF CHOLECYSTECTOMY: Status: ACTIVE | Noted: 2022-10-21

## 2022-10-21 PROBLEM — N39.41 URGE INCONTINENCE OF URINE: Status: ACTIVE | Noted: 2022-10-21

## 2022-10-21 PROBLEM — I82.409 DEEP VENOUS THROMBOSIS: Status: ACTIVE | Noted: 2022-10-21

## 2022-10-21 PROBLEM — F43.10 POST-TRAUMATIC STRESS DISORDER, UNSPECIFIED: Status: ACTIVE | Noted: 2022-10-21

## 2022-10-21 PROBLEM — K90.9 INTESTINAL MALABSORPTION: Status: ACTIVE | Noted: 2022-10-21

## 2022-10-21 PROBLEM — C61 CARCINOMA OF PROSTATE: Status: ACTIVE | Noted: 2022-10-21

## 2022-10-21 PROBLEM — E78.5 DYSLIPIDEMIA: Status: ACTIVE | Noted: 2022-10-21

## 2022-10-21 PROBLEM — K21.9 GASTROESOPHAGEAL REFLUX DISEASE: Status: ACTIVE | Noted: 2022-10-21

## 2022-10-21 PROCEDURE — 99999 PR PBB SHADOW E&M-EST. PATIENT-LVL IV: CPT | Mod: PBBFAC,,, | Performed by: PHYSICIAN ASSISTANT

## 2022-10-21 PROCEDURE — 86334 PATHOLOGIST INTERPRETATION IFE: ICD-10-PCS | Mod: 26,,, | Performed by: PATHOLOGY

## 2022-10-21 PROCEDURE — 99214 OFFICE O/P EST MOD 30 MIN: CPT | Mod: PBBFAC | Performed by: PHYSICIAN ASSISTANT

## 2022-10-21 PROCEDURE — 99215 PR OFFICE/OUTPT VISIT, EST, LEVL V, 40-54 MIN: ICD-10-PCS | Mod: S$PBB,,, | Performed by: PHYSICIAN ASSISTANT

## 2022-10-21 PROCEDURE — 84165 PROTEIN E-PHORESIS SERUM: CPT | Mod: 26,,, | Performed by: PATHOLOGY

## 2022-10-21 PROCEDURE — 36415 COLL VENOUS BLD VENIPUNCTURE: CPT | Performed by: PHYSICIAN ASSISTANT

## 2022-10-21 PROCEDURE — 99999 PR PBB SHADOW E&M-EST. PATIENT-LVL IV: ICD-10-PCS | Mod: PBBFAC,,, | Performed by: PHYSICIAN ASSISTANT

## 2022-10-21 PROCEDURE — 99215 OFFICE O/P EST HI 40 MIN: CPT | Mod: S$PBB,,, | Performed by: PHYSICIAN ASSISTANT

## 2022-10-21 PROCEDURE — 84165 PROTEIN E-PHORESIS SERUM: CPT | Performed by: PHYSICIAN ASSISTANT

## 2022-10-21 PROCEDURE — 86334 IMMUNOFIX E-PHORESIS SERUM: CPT | Performed by: PHYSICIAN ASSISTANT

## 2022-10-21 PROCEDURE — 86334 IMMUNOFIX E-PHORESIS SERUM: CPT | Mod: 26,,, | Performed by: PATHOLOGY

## 2022-10-21 PROCEDURE — 84165 PATHOLOGIST INTERPRETATION SPE: ICD-10-PCS | Mod: 26,,, | Performed by: PATHOLOGY

## 2022-10-21 NOTE — PROGRESS NOTES
[facility] - NEUROLOGY [address]TH [place] [facility], [place]    Date: 10/21/22  Patient Name: Antione Torres   MRN: [id number]   PCP: JAMEL Farrar  Referring Provider: No ref. provider found    Chief Complaint: Foot Drop and Muscle weakness  Subjective:     HPI:   Mr. Antione Torres is a 72 y.o. male with HTN, prostate cancer, GERD, and L foot drop presenting for evaluation regarding bilateral foot drop. On chart review the patient was previously seen by Dr. Nuñez. His current therapy regimen is Imuran 50mg BID. He previously tried prednisone and did not have beneficial results. His initial work up was with [facility]. He denies any problems with swallowing or chewing. He denies any cola colored urine. Does endorse some weakness of the hands but this is attributed to his arthritis denies pain other places on the upper extremities; denies any other upper extremity weakness. Patient uses an AFO which significantly improves his ability to walk he also walks with a walker. He notes that he has weakness from the lower back down.     He notes that he had swelling of the bilateral feet which discontinued with use of the Imuran     Initial presentation- 7-8 years ago with a subtle foot drop of the L foot. He notes he would trip frequently. Per the patient and his wife he noticed more of his symptoms during lockdown because patient and his wife were walking frequently and he was having difficulty with walking.     Notes that he had frequent burning/tingling of the bilateral hands and feet in his 40s he notes that it would happen off and on for several years but it no longer occurs. He denies any current numbness and tingling he finds that both feet have some numbness/tingling.     Prostate carcinoma- Sx 20 years ago, Radiation 11 years ago      Note from Dr. Nuñez on 06/30/2022-   Hx of IVIg infusions   LP showed borderline? Unclear what protein level was   Sural Nerve Bx- WNL   EMG-  peripheral neuropathy     Of note no EMG records in [facility].     PAST MEDICAL HISTORY:  Past Medical History:   Diagnosis Date    Hyperlipidemia     Hypertension        PAST SURGICAL HISTORY:  Past Surgical History:   Procedure Laterality Date    CHOLECYSTECTOMY      COLONOSCOPY      COLONOSCOPY N/A 7/25/2016    Procedure: COLONOSCOPY;  Surgeon: Stanley Woo MD;  Location: [facility] ([address] [place] [address]);  Service: Endoscopy;  Laterality: N/A;    HERNIA REPAIR      NECK SURGERY      PROSTATE SURGERY         CURRENT MEDS:  Current Outpatient Medications   Medication Sig Dispense Refill    acetaminophen (TYLENOL) 650 MG TbSR 2 tablets as needed      amLODIPine (NORVASC) 2.5 MG tablet Take 2.5 mg by mouth once daily.      atorvastatin (LIPITOR) 80 MG tablet 80 mg.      azaTHIOprine (IMURAN) 50 mg Tab Take 1 tablet (50 mg total) by mouth 2 (two) times daily. 30 tablet 0    cannabidioL (EPIDIOLEX) 100 mg/mL as directed      cholecalciferol, vitamin D3, (VITAMIN D3) 50 mcg (2,000 unit) Tab TAKE 2000UNIT BY MOUTH ONCE DAILY FOR VITAMIN D.  TAKE WITH FOOD.      CRESTOR 5 mg tablet       CRESTOR 5 mg tablet       ezetimibe (ZETIA) 10 mg tablet Take 10 mg by mouth once daily.      losartan (COZAAR) 50 MG tablet Take 50 mg by mouth once daily.      meclizine (ANTIVERT) 25 mg tablet       methocarbamol (ROBAXIN) 750 MG Tab Take 1 tablet (750 mg total) by mouth 3 (three) times daily. 15 tablet 0    mirabegron (MYRBETRIQ) 25 mg Tb24 ER tablet Take 1 tablet (25 mg total) by mouth once daily. 90 tablet 3    multivitamin (THERAGRAN) per tablet Take 1 tablet by mouth once daily.      naproxen (NAPROSYN) 500 MG tablet       NEXIUM 40 mg capsule       nystatin-triamcinolone (MYCOLOG II) cream Apply topically 2 (two) times daily. 60 g 3    oxybutynin (DITROPAN-XL) 5 MG TR24 Take 2 tablets by mouth once daily.      pantoprazole (PROTONIX) 40 MG tablet 40 mg.      paroxetine (PAXIL) 30 MG tablet Take 30 mg by mouth every morning.      prazosin  (MINIPRESS) 5 MG capsule Take 1 capsule by mouth every evening.      ranitidine (ZANTAC) 300 MG capsule       solifenacin (VESICARE) 10 MG tablet Take 1 tablet (10 mg total) by mouth once daily. 90 tablet 3    zinc sulfate (ZINC-15) 66 mg Tab 2 tablets       No current facility-administered medications for this visit.       ALLERGIES:  Review of patient's allergies indicates:   Allergen Reactions    Pcn [penicillins] Hives, Swelling and Rash       FAMILY HISTORY:  History reviewed. No pertinent family history.    SOCIAL HISTORY:  Social History     Tobacco Use    Smoking status: Former     Types: Cigarettes     Quit date: 1999     Years since quittin.9    Smokeless tobacco: Never    Tobacco comments:     cigarettes   Substance Use Topics    Alcohol use: Yes     Alcohol/week: 1.0 standard drink     Types: 1 Shots of liquor per week     Comment: moderate    Drug use: No       Review of Systems:  Review of Systems   Constitutional:  Negative for chills, fever, malaise/fatigue and weight loss.   HENT:  Negative for ear discharge, ear pain, hearing loss, sinus pain, sore throat and tinnitus.    Eyes:  Negative for blurred vision, double vision and photophobia.   Respiratory:  Negative for cough, shortness of breath and wheezing.    Cardiovascular:  Negative for chest pain, palpitations, orthopnea and leg swelling (States previously had this but it improved on the Imuran).   Gastrointestinal:  Positive for diarrhea and nausea. Negative for abdominal pain, constipation and vomiting.   Genitourinary:  Negative for dysuria, frequency and urgency.   Musculoskeletal:  Positive for back pain (2/2 Arthritis) and falls (regular falls with loss of balance he notes that he does not attempt to catch himself). Negative for myalgias and neck pain.   Neurological:  Positive for weakness. Negative for tingling, seizures, loss of consciousness and headaches.          Objective:     Vitals:    10/21/22 0906   BP: (!) 143/83  "  Pulse: 90   Weight: 91.2 kg (201 lb 1 oz)   Height: 5' 11" (1.803 m)         Lab Results   Component Value Date    WBC 7.65 10/13/2022    HGB 14.1 10/13/2022    HCT 41.4 10/13/2022     10/13/2022    CHOL 176 08/27/2018    TRIG 125 08/27/2018    HDL 44 08/27/2018    ALT 44 10/13/2022    AST 47 (H) 10/13/2022     10/13/2022    K 4.3 10/13/2022     10/13/2022    CREATININE 0.76 10/13/2022    BUN 12 10/13/2022    CO2 28 10/13/2022       NEUROLOGICAL EXAMINATION:     MENTAL STATUS   Oriented to person, place, and time.   Level of consciousness: alert    MOTOR EXAM   Muscle bulk: normal  Overall muscle tone: normal    Strength   Strength 5/5 except as noted.   Right iliopsoas: 3/5  Left iliopsoas: 3/5         L Knee Extension 4-/5   L Knee Flexion 4-/5    R Knee Extension 4/5  R Knee Flexion 5/5    R Dorsiflexion 0/5  L Dorsiflexion 0/5    R Plantar Flexion 3/5  L Plantar Flexion 4-/5    R Internal Foot Rotation 3/5  L Internal Foot Rotation 3-/5    R External Foot Rotation 3/5  L External Foot Rotation 0/5     REFLEXES     Reflexes   Right brachioradialis: 2+  Left brachioradialis: 1+  Right biceps: 1+  Left biceps: 1+  Right triceps: 1+  Left triceps: 1+  Right patellar: 1+  Left patellar: 1+  Right achilles: 0  Left achilles: 0  Right plantar: equivocal  Left plantar: equivocal  Right Chapman: absent  Left Chapman: absent  Right ankle clonus: absent  Left ankle clonus: absent    SENSORY EXAM   Right leg vibration: decreased from toes  Left leg vibration: decreased from ankle  Right arm pinprick: decreased from elbow  Left arm pinprick: decreased from elbow  Right leg pinprick: absent above the knee bilaterally.  Left leg pinprick: absent above the knee bilaterally.    GAIT AND COORDINATION     Tremor   Resting tremor: absent  Intention tremor: absent  Action tremor: absent       Bilateral Foot Drop noted    ? ?        Assessment:   Antione Torres is a 72 y.o. male presenting for evaluation regarding " bilateral foot drop and muscle weakness.     Plan:   Due to the inability to see clinical evidence of patient nerve study, CSF protein, and evidence supporting CIDP diagnosis will complete full work up and evaluation. Discussed this case with Dr. Posey and will continue the patient on Imuran therapy for the time being as it has been beneficial and he is in remission for his prostate cancer. Will fax [facility] to [facility] for records request.     EMG repeat requested and will schedule soon       AFO referral placed and physical therapy referral also placed.       Problem List Items Addressed This Visit          Neuro    Left foot drop    Relevant Orders    EMG W/ ULTRASOUND AND NERVE CONDUCTION TEST 2 Extremities    MyoMarker Panel 3    PROTEIN ELECTROPHORESIS, SERUM (Completed)    IMMUNOFIXATION ELECTROPHORESIS, SERUM     Other Visit Diagnoses       Bilateral foot-drop    -  Primary    Relevant Orders    ANKLE FOOT ORTHOSIS FOR HOME USE    Ambulatory referral/consult to Physical/Occupational Therapy    Polyneuropathy        Relevant Orders    EMG W/ ULTRASOUND AND NERVE CONDUCTION TEST 2 Extremities    MyoMarker Panel 3    PROTEIN ELECTROPHORESIS, SERUM (Completed)    IMMUNOFIXATION ELECTROPHORESIS, SERUM              I spent a total of 62 minutes on the day of the visit. This includes face to face time and non-face to face time preparing to see the patient (eg, review of tests), obtaining and/or reviewing separately obtained history, documenting clinical information in the electronic or other health record, independently interpreting results and communicating results to the patient/family/caregiver, or care coordinator.    Lupe Taylor PA-C  Supervising physician Rick Posey MD was available for all questions during this exam.  [facility]

## 2022-10-24 LAB
ALBUMIN SERPL ELPH-MCNC: 3.72 G/DL (ref 3.35–5.55)
ALPHA1 GLOB SERPL ELPH-MCNC: 0.44 G/DL (ref 0.17–0.41)
ALPHA2 GLOB SERPL ELPH-MCNC: 0.99 G/DL (ref 0.43–0.99)
B-GLOBULIN SERPL ELPH-MCNC: 0.83 G/DL (ref 0.5–1.1)
GAMMA GLOB SERPL ELPH-MCNC: 1.02 G/DL (ref 0.67–1.58)
INTERPRETATION SERPL IFE-IMP: NORMAL
PROT SERPL-MCNC: 7 G/DL (ref 6–8.4)

## 2022-10-25 DIAGNOSIS — D84.821 IMMUNOSUPPRESSION DUE TO DRUG THERAPY: Primary | ICD-10-CM

## 2022-10-25 DIAGNOSIS — Z79.899 IMMUNOSUPPRESSION DUE TO DRUG THERAPY: Primary | ICD-10-CM

## 2022-10-25 DIAGNOSIS — R79.9 ABNORMAL FINDING OF BLOOD CHEMISTRY, UNSPECIFIED: ICD-10-CM

## 2022-10-25 DIAGNOSIS — G61.81 CIDP (CHRONIC INFLAMMATORY DEMYELINATING POLYNEUROPATHY): ICD-10-CM

## 2022-10-25 RX ORDER — AZATHIOPRINE 50 MG/1
50 TABLET ORAL 2 TIMES DAILY
Qty: 60 TABLET | Refills: 11 | Status: SHIPPED | OUTPATIENT
Start: 2022-10-25 | End: 2023-09-21

## 2022-10-26 LAB
PATHOLOGIST INTERPRETATION IFE: NORMAL
PATHOLOGIST INTERPRETATION SPE: NORMAL

## 2022-11-02 LAB
ANTI-PM/SCL AB: <20 UNITS
ANTI-SS-A 52 KD AB, IGG: <20 UNITS
EJ AB SER QL: NEGATIVE
ENA JO1 AB SER IA-ACNC: <20 UNITS
ENA SM+RNP AB SER IA-ACNC: <20 UNITS
FIBRILLARIN (U3 RNP): NEGATIVE
KU AB SER QL: NEGATIVE
MDA-5 (P140): <20 UNITS
MI2 AB SER QL: NEGATIVE
NXP-2 (P140): <20 UNITS
OJ AB SER QL: NEGATIVE
PL12 AB SER QL: NEGATIVE
PL7 AB SER QL: NEGATIVE
SRP AB SERPL QL: NEGATIVE
TIF1 GAMMA (P155/140): <20 UNITS
U2 SNRNP: NEGATIVE

## 2022-12-19 ENCOUNTER — TELEPHONE (OUTPATIENT)
Dept: NEUROLOGY | Facility: CLINIC | Age: 72
End: 2022-12-19
Payer: MEDICARE

## 2022-12-19 ENCOUNTER — PATIENT MESSAGE (OUTPATIENT)
Dept: NEUROLOGY | Facility: CLINIC | Age: 72
End: 2022-12-19
Payer: MEDICARE

## 2022-12-19 NOTE — TELEPHONE ENCOUNTER
Final Anesthesia Post-op Assessment    Patient: Waylon Cisse  Procedure(s) Performed: FLEXIBLE SIGMOIDOSCOPY  Anesthesia type: MAC    Vitals Value Taken Time   Temp 36.9 °C (98.4 °F) 02/04/21 1530   Pulse 87 02/04/21 1530   Resp 15 02/04/21 1530   SpO2 98 % 02/04/21 1530   BP 80/53 02/04/21 1530         Patient Location: PACU Phase 1  Post-op Vital Signs:stable  Level of Consciousness: participates in exam  Respiratory Status: spontaneous ventilation  Cardiovascular blood pressure returned to baseline  Hydration: euvolemic  Pain Management: well controlled  Handoff: Handoff to receiving clinician was performed and questions were answered  Vomiting: none   Nausea: None  Airway Patency:patent  Post-op Assessment: no complications and patient tolerated procedure well with no complications      No complications documented.    Spoke to the patient to confirm 1/30 at 2:00 appointment.

## 2023-01-30 ENCOUNTER — LAB VISIT (OUTPATIENT)
Dept: LAB | Facility: HOSPITAL | Age: 73
End: 2023-01-30
Payer: MEDICARE

## 2023-01-30 ENCOUNTER — OFFICE VISIT (OUTPATIENT)
Dept: NEUROLOGY | Facility: CLINIC | Age: 73
End: 2023-01-30
Payer: MEDICARE

## 2023-01-30 VITALS
SYSTOLIC BLOOD PRESSURE: 150 MMHG | BODY MASS INDEX: 29.32 KG/M2 | WEIGHT: 209.44 LBS | HEIGHT: 71 IN | HEART RATE: 68 BPM | DIASTOLIC BLOOD PRESSURE: 77 MMHG

## 2023-01-30 DIAGNOSIS — G62.9 POLYNEUROPATHY: ICD-10-CM

## 2023-01-30 DIAGNOSIS — I10 HYPERTENSION, UNSPECIFIED TYPE: ICD-10-CM

## 2023-01-30 DIAGNOSIS — R27.0 ATAXIA, UNSPECIFIED: ICD-10-CM

## 2023-01-30 DIAGNOSIS — G60.9 HEREDITARY AND IDIOPATHIC NEUROPATHY, UNSPECIFIED: ICD-10-CM

## 2023-01-30 DIAGNOSIS — G62.9 POLYNEUROPATHY: Primary | ICD-10-CM

## 2023-01-30 PROCEDURE — 99999 PR PBB SHADOW E&M-EST. PATIENT-LVL IV: CPT | Mod: PBBFAC,,, | Performed by: PSYCHIATRY & NEUROLOGY

## 2023-01-30 PROCEDURE — 99999 PR PBB SHADOW E&M-EST. PATIENT-LVL IV: ICD-10-PCS | Mod: PBBFAC,,, | Performed by: PSYCHIATRY & NEUROLOGY

## 2023-01-30 PROCEDURE — 99215 OFFICE O/P EST HI 40 MIN: CPT | Mod: S$PBB,,, | Performed by: PSYCHIATRY & NEUROLOGY

## 2023-01-30 PROCEDURE — 99215 PR OFFICE/OUTPT VISIT, EST, LEVL V, 40-54 MIN: ICD-10-PCS | Mod: S$PBB,,, | Performed by: PSYCHIATRY & NEUROLOGY

## 2023-01-30 PROCEDURE — 36415 COLL VENOUS BLD VENIPUNCTURE: CPT | Performed by: PHYSICIAN ASSISTANT

## 2023-01-30 PROCEDURE — 99214 OFFICE O/P EST MOD 30 MIN: CPT | Mod: PBBFAC | Performed by: PSYCHIATRY & NEUROLOGY

## 2023-01-30 NOTE — ASSESSMENT & PLAN NOTE
Discussed with patient importance of management of hypertension due to secondary stroke risk. Patient is on appropriate therapy currently managed by PCP.

## 2023-01-30 NOTE — PROGRESS NOTES
[facility] - NEUROLOGY 7TH [place] [facility], [place]    Date: 1/30/23  Patient Name: Antione Torres   MRN: [id number]   PCP: JAMEL Farrar (Inactive)      Chief Complaint: Foot Drop and Muscle weakness  Subjective:     Interval History 02/05/2023    I have reviewed all relevant history in Epic. The patient presents for routine follow up regarding CIDP and therapy plan. He has an aunt who has drop foot and a cousin who also has drop foot. He presents with his wife who is a nurse manager. He has met with someone who will help to get him an AFO on his right foot. He notes that IVIg did not give him any benefit in the past. He notes that he has had a steady decline in his ability to walk since his onset of symptoms. He has a  who comes to his house and physical therapy. He does two exercise therapies per week. 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     Patient brought lab work from outside provider   Cr- 0.61 on 11/3/22 all other labs WNL       HPI:   Mr. Antione Torres is a 73 y.o. male with HTN, prostate cancer, GERD, and L foot drop presenting for evaluation regarding bilateral foot drop. On chart review the patient was previously seen by Dr. Nuñez. His current therapy regimen is Imuran 50mg BID. He previously tried prednisone and did not have beneficial results. His initial work up was with [facility]. He denies any problems with swallowing or chewing. He denies any cola colored urine. Does endorse some weakness of the hands but this is attributed to his arthritis denies pain other places on the upper extremities; denies any other upper extremity weakness. Patient uses an AFO which significantly improves his ability to walk he also walks with a walker. He notes that he has weakness from the lower back down.     He notes that he had swelling of the bilateral feet which discontinued with use of the Imuran      Initial presentation- 7-8 years ago with a subtle foot drop of the L foot. He notes he would trip frequently. Per the patient and his wife he noticed more of his symptoms during lockdown because patient and his wife were walking frequently and he was having difficulty with walking.     Notes that he had frequent burning/tingling of the bilateral hands and feet in his 40s he notes that it would happen off and on for several years but it no longer occurs. He denies any current numbness and tingling he finds that both feet have some numbness/tingling.     Prostate carcinoma- Sx 20 years ago, Radiation 11 years ago      Note from Dr. Nuñez on 06/30/2022-   Hx of IVIg infusions   LP showed borderline? Unclear what protein level was   Sural Nerve Bx- WNL   EMG- peripheral neuropathy     Of note no EMG records in [facility].     PAST MEDICAL HISTORY:  Past Medical History:   Diagnosis Date    Hyperlipidemia     Hypertension        PAST SURGICAL HISTORY:  Past Surgical History:   Procedure Laterality Date    CHOLECYSTECTOMY      COLONOSCOPY      COLONOSCOPY N/A 7/25/2016    Procedure: COLONOSCOPY;  Surgeon: Stanley Woo MD;  Location: [facility] ([address] [place] [address]);  Service: Endoscopy;  Laterality: N/A;    HERNIA REPAIR      NECK SURGERY      PROSTATE SURGERY         CURRENT MEDS:  Current Outpatient Medications   Medication Sig Dispense Refill    acetaminophen (TYLENOL) 650 MG TbSR 2 tablets as needed      amLODIPine (NORVASC) 2.5 MG tablet Take 2.5 mg by mouth once daily.      atorvastatin (LIPITOR) 80 MG tablet 80 mg.      azaTHIOprine (IMURAN) 50 mg Tab Take 1 tablet (50 mg total) by mouth 2 (two) times daily. 60 tablet 11    cannabidioL (EPIDIOLEX) 100 mg/mL as directed      cholecalciferol, vitamin D3, (VITAMIN D3) 50 mcg (2,000 unit) Tab TAKE 2000UNIT BY MOUTH ONCE DAILY FOR VITAMIN D.  TAKE WITH FOOD.      CRESTOR 5 mg tablet       CRESTOR 5 mg tablet       ezetimibe (ZETIA) 10 mg tablet Take 10 mg by mouth once daily.       losartan (COZAAR) 50 MG tablet Take 50 mg by mouth once daily.      meclizine (ANTIVERT) 25 mg tablet       methocarbamol (ROBAXIN) 750 MG Tab Take 1 tablet (750 mg total) by mouth 3 (three) times daily. 15 tablet 0    mirabegron (MYRBETRIQ) 25 mg Tb24 ER tablet Take 1 tablet (25 mg total) by mouth once daily. 90 tablet 3    multivitamin (THERAGRAN) per tablet Take 1 tablet by mouth once daily.      naproxen (NAPROSYN) 500 MG tablet       NEXIUM 40 mg capsule       nystatin-triamcinolone (MYCOLOG II) cream Apply topically 2 (two) times daily. 60 g 3    oxybutynin (DITROPAN-XL) 5 MG TR24 Take 2 tablets by mouth once daily.      pantoprazole (PROTONIX) 40 MG tablet 40 mg.      paroxetine (PAXIL) 30 MG tablet Take 30 mg by mouth every morning.      prazosin (MINIPRESS) 5 MG capsule Take 1 capsule by mouth every evening.      ranitidine (ZANTAC) 300 MG capsule       solifenacin (VESICARE) 10 MG tablet Take 1 tablet (10 mg total) by mouth once daily. 90 tablet 3    zinc sulfate (ZINC-15) 66 mg Tab 2 tablets       No current facility-administered medications for this visit.       ALLERGIES:  Review of patient's allergies indicates:   Allergen Reactions    Pcn [penicillins] Hives, Swelling and Rash       FAMILY HISTORY:  History reviewed. No pertinent family history.    SOCIAL HISTORY:  Social History     Tobacco Use    Smoking status: Former     Types: Cigarettes     Quit date: 1999     Years since quittin.2    Smokeless tobacco: Never    Tobacco comments:     cigarettes   Substance Use Topics    Alcohol use: Yes     Alcohol/week: 1.0 standard drink     Types: 1 Shots of liquor per week     Comment: moderate    Drug use: No       Review of Systems:  Review of Systems   Constitutional:  Negative for chills, fever, malaise/fatigue and weight loss.   HENT:  Negative for ear discharge, ear pain, hearing loss, sinus pain, sore throat and tinnitus.    Eyes:  Negative for blurred vision, double vision and  "photophobia.   Respiratory:  Negative for cough, shortness of breath and wheezing.    Cardiovascular:  Negative for chest pain, palpitations, orthopnea and leg swelling (States previously had this but it improved on the Imuran).   Gastrointestinal:  Positive for diarrhea and nausea. Negative for abdominal pain, constipation and vomiting.   Genitourinary:  Negative for dysuria, frequency and urgency.   Musculoskeletal:  Positive for back pain (2/2 Arthritis) and falls (regular falls with loss of balance he notes that he does not attempt to catch himself). Negative for myalgias and neck pain.   Neurological:  Positive for weakness. Negative for tingling, seizures, loss of consciousness and headaches.          Objective:     Vitals:    01/30/23 1357   BP: (!) 150/77   Pulse: 68   Weight: 95 kg (209 lb 7 oz)   Height: 5' 11" (1.803 m)           Lab Results   Component Value Date    WBC 7.65 10/13/2022    HGB 14.1 10/13/2022    HCT 41.4 10/13/2022     10/13/2022    CHOL 176 08/27/2018    TRIG 125 08/27/2018    HDL 44 08/27/2018    ALT 44 10/13/2022    AST 47 (H) 10/13/2022     10/13/2022    K 4.3 10/13/2022     10/13/2022    CREATININE 0.76 10/13/2022    BUN 12 10/13/2022    CO2 28 10/13/2022       NEUROLOGICAL EXAMINATION:     MENTAL STATUS   Oriented to person, place, and time.   Level of consciousness: alert    MOTOR EXAM   Muscle bulk: normal  Overall muscle tone: normal    Strength   Strength 5/5 except as noted.   Right iliopsoas: 3/5  Left iliopsoas: 3/5       L Iliopsoas 3-/5  R Iliopsoas 3-/5    L Knee Extension 4-/5   L Knee Flexion 4-/5    R Knee Extension 4/5  R Knee Flexion 5/5    R Dorsiflexion 2/5  L Dorsiflexion 0/5    R Plantar Flexion 3/5  L Plantar Flexion 4-/5    R Internal Foot Rotation 3/5  L Internal Foot Rotation 3-/5    R External Foot Rotation 3/5  L External Foot Rotation 0/5     REFLEXES     Reflexes   Right brachioradialis: 2+  Left brachioradialis: 2+  Right biceps: " 2+  Left biceps: 2+  Right triceps: 2+  Left triceps: 2+  Right patellar: 1+  Left patellar: 1+  Right achilles: 0  Left achilles: 0  Right plantar: equivocal  Left plantar: equivocal  Right Chapman: absent  Left Chapman: absent  Right ankle clonus: absent  Left ankle clonus: absent    SENSORY EXAM   Right leg vibration: decreased from toes  Left leg vibration: decreased from ankle  Right arm pinprick: decreased from elbow  Left arm pinprick: decreased from elbow  Right leg pinprick: absent above the knee bilaterally.  Left leg pinprick: absent above the knee bilaterally.    GAIT AND COORDINATION     Tremor   Resting tremor: absent  Intention tremor: absent  Action tremor: absent       Bilateral Foot Drop noted    ? ?        Assessment:   Antione Torres is a 73 y.o. male presenting for evaluation regarding bilateral foot drop and muscle weakness.     Plan:   Discussed with the patient doing genetic testing for CMT and patient would like to move forward with this study. Presentation and exam findings are not so much consistent with CIDP, and there are no objective measures in the patient chart to corroborate the diagnosis of CIDP.     Paraneoplastic workup for the patient to evaluate is there is a possible issue with metabolic disorder    Possibly repeat EMG pending the genetic testing       Problem List Items Addressed This Visit       Hypertension    Current Assessment & Plan     Discussed with patient importance of management of hypertension due to secondary stroke risk. Patient is on appropriate therapy currently managed by PCP.               Other Visit Diagnoses       Polyneuropathy    -  Primary    Relevant Orders    Paraneoplastic Autoantibody Evaulation, Serum    Ataxia, unspecified        Hereditary and idiopathic neuropathy, unspecified                    I spent a total of 62 minutes on the day of the visit. This includes face to face time and non-face to face time preparing to see the patient (eg, review  of tests), obtaining and/or reviewing separately obtained history, documenting clinical information in the electronic or other health record, independently interpreting results and communicating results to the patient/family/caregiver, or care coordinator.    EMORY Diamond MD  [facility]

## 2023-02-07 LAB
AMPHIPHYSIN AB TITR SER: NEGATIVE {TITER}
CV2 IGG TITR SER: NEGATIVE {TITER}
GLIAL NUC TYPE 1 AB TITR SER: NEGATIVE {TITER}
HU1 AB TITR SER: NEGATIVE {TITER}
HU2 AB TITR SER IF: NEGATIVE {TITER}
HU3 AB TITR SER: NEGATIVE {TITER}
IMMUNOLOGIST REVIEW: NORMAL
PCA-1 AB TITR SER: NEGATIVE {TITER}
PCA-TR AB TITR SER: NEGATIVE {TITER}
PURKINJE CELL CYTOPLASMIC AB TYPE2: NEGATIVE
VGCC-P/Q BIND AB SER-SCNC: 0.02 NMOL/L
VGKC AB SER-SCNC: 0 NMOL/L

## 2023-02-15 ENCOUNTER — PATIENT MESSAGE (OUTPATIENT)
Dept: NEUROLOGY | Facility: CLINIC | Age: 73
End: 2023-02-15
Payer: MEDICARE

## 2023-04-04 ENCOUNTER — TELEPHONE (OUTPATIENT)
Dept: NEUROLOGY | Facility: CLINIC | Age: 73
End: 2023-04-04
Payer: MEDICARE

## 2023-04-04 NOTE — TELEPHONE ENCOUNTER
----- Message from Josie Willingham sent at 4/4/2023  4:21 PM CDT -----  Regarding: Patient  Contact: Ruth [phone]  Patient wife requesting a call back regards to test that needs to be order, leg brace and medication if to discontinued or not and to schedule next appt states second request

## 2023-04-05 ENCOUNTER — PATIENT MESSAGE (OUTPATIENT)
Dept: NEUROLOGY | Facility: CLINIC | Age: 73
End: 2023-04-05
Payer: MEDICARE

## 2023-04-17 ENCOUNTER — PATIENT MESSAGE (OUTPATIENT)
Dept: NEUROLOGY | Facility: CLINIC | Age: 73
End: 2023-04-17
Payer: MEDICARE

## 2023-05-01 ENCOUNTER — TELEPHONE (OUTPATIENT)
Dept: NEUROLOGY | Facility: CLINIC | Age: 73
End: 2023-05-01
Payer: MEDICARE

## 2023-05-01 NOTE — TELEPHONE ENCOUNTER
----- Message from Lakihsa Cantu sent at 5/1/2023  9:59 AM CDT -----  Contact: Patient wife  Type:  Patient Call          Who Called: Patient wife         Does the patient know what this is regarding?: Requesting a callback she said that she's been trying to get a call back for three months to discuss clinical questions about her  condition ; please advise           Would the patient rather a call back or a response via [facility]? Call           Best Call Back Number: [phone]             Additional Information: Pt would like to speak to a manager

## 2023-05-22 ENCOUNTER — PATIENT MESSAGE (OUTPATIENT)
Dept: NEUROLOGY | Facility: CLINIC | Age: 73
End: 2023-05-22

## 2023-05-22 DIAGNOSIS — M21.372 LEFT FOOT DROP: ICD-10-CM

## 2023-05-22 DIAGNOSIS — M21.371 BILATERAL FOOT-DROP: ICD-10-CM

## 2023-05-22 DIAGNOSIS — G62.9 POLYNEUROPATHY: Primary | ICD-10-CM

## 2023-05-22 DIAGNOSIS — M21.372 BILATERAL FOOT-DROP: ICD-10-CM

## 2023-06-22 ENCOUNTER — PATIENT MESSAGE (OUTPATIENT)
Dept: NEUROLOGY | Facility: CLINIC | Age: 73
End: 2023-06-22
Payer: MEDICARE

## 2023-07-10 ENCOUNTER — OFFICE VISIT (OUTPATIENT)
Dept: UROLOGY | Facility: CLINIC | Age: 73
End: 2023-07-10
Payer: MEDICARE

## 2023-07-10 VITALS
WEIGHT: 211.63 LBS | HEIGHT: 71 IN | HEART RATE: 66 BPM | DIASTOLIC BLOOD PRESSURE: 69 MMHG | SYSTOLIC BLOOD PRESSURE: 145 MMHG | BODY MASS INDEX: 29.63 KG/M2

## 2023-07-10 DIAGNOSIS — R35.0 URINARY FREQUENCY: Primary | ICD-10-CM

## 2023-07-10 DIAGNOSIS — N39.41 URGE INCONTINENCE OF URINE: ICD-10-CM

## 2023-07-10 PROCEDURE — 99214 OFFICE O/P EST MOD 30 MIN: CPT | Mod: PBBFAC | Performed by: UROLOGY

## 2023-07-10 PROCEDURE — 99999 PR PBB SHADOW E&M-EST. PATIENT-LVL IV: CPT | Mod: PBBFAC,,, | Performed by: UROLOGY

## 2023-07-10 PROCEDURE — 99214 PR OFFICE/OUTPT VISIT, EST, LEVL IV, 30-39 MIN: ICD-10-PCS | Mod: S$PBB,,, | Performed by: UROLOGY

## 2023-07-10 PROCEDURE — 99999 PR PBB SHADOW E&M-EST. PATIENT-LVL IV: ICD-10-PCS | Mod: PBBFAC,,, | Performed by: UROLOGY

## 2023-07-10 PROCEDURE — 99214 OFFICE O/P EST MOD 30 MIN: CPT | Mod: S$PBB,,, | Performed by: UROLOGY

## 2023-07-10 RX ORDER — OXYBUTYNIN CHLORIDE 5 MG/1
5 TABLET ORAL 3 TIMES DAILY
Qty: 30 TABLET | Refills: 11 | Status: SHIPPED | OUTPATIENT
Start: 2023-07-10 | End: 2024-07-09

## 2023-07-10 RX ORDER — DULOXETIN HYDROCHLORIDE 30 MG/1
3 CAPSULE, DELAYED RELEASE ORAL DAILY
COMMUNITY
Start: 2023-04-25

## 2023-07-10 NOTE — PROGRESS NOTES
"[facility] [address]   Clinic Note    SUBJECTIVE:     Chief Complaint: prostate cancer    History of Present Illness:  Antione Torres is a 73 y.o. male with a history of prostate cancer; s/p RRP 2001 and XRT 2 years later. Recent Psa 0.07 and stable.     (+) urinary frequency and urgency. Had been taking Vesciare 10mg daily but [facility] to oxybutynin 5mg XL.  Helps with frequency but reports dry eyes and some constipation.   Now taking myrbetriq. Reports more urinary frequency. Would like something else.     He has ED. Has tried Cialis and viagra and disliked due to headache. No longer using JEOVANNY. He disliked injections. He will talk about IPP with his wife.       OBJECTIVE:     Estimated body mass index is 29.51 kg/m² as calculated from the following:    Height as of this encounter: 5' 11" (1.803 m).    Weight as of this encounter: 96 kg (211 lb 9.6 oz).    Vital Signs (Most Recent)  Pulse: 66 (07/10/23 1513)  BP: (!) 145/69 (07/10/23 1513)    Physical Exam  Constitutional:       General: He is not in acute distress.  HENT:      Head: Normocephalic.   Cardiovascular:      Rate and Rhythm: Normal rate.   Pulmonary:      Effort: Pulmonary effort is normal.   Abdominal:      General: There is no distension.      Palpations: Abdomen is soft.      Tenderness: There is no abdominal tenderness.   Musculoskeletal:         General: Normal range of motion.   Skin:     General: Skin is warm and dry.   Neurological:      Mental Status: He is alert.      Coordination: Coordination normal.   Psychiatric:         Behavior: Behavior normal.       Lab Results   Component Value Date    BUN 12 10/13/2022    CREATININE 0.76 10/13/2022    WBC 7.65 10/13/2022    HGB 14.1 10/13/2022    HCT 41.4 10/13/2022     10/13/2022    AST 47 (H) 10/13/2022    ALT 44 10/13/2022    ALKPHOS 126 10/13/2022    ALBUMIN 4.5 10/13/2022        Lab Results   Component Value Date    PSA 0.01 04/30/2013    PSA <0.010 " 10/31/2012    PSA <0.010 04/13/2012    PSA 0.01 09/23/2011    PSA 0.02 03/04/2011    PSA 0.06 09/16/2010    PSA 0.13 07/02/2010    PSA 0.20 06/07/2010    PSA 0.24 04/27/2010    PSA 0.29 03/01/2010    PSADIAG 0.07 06/29/2023    PSADIAG 0.06 08/08/2022    PSADIAG 0.08 07/30/2021    PSADIAG 0.05 08/04/2020    PSADIAG 0.06 08/12/2019    PSADIAG 0.06 08/06/2018    PSADIAG 0.06 08/01/2017    PSADIAG 0.04 08/02/2016    PSADIAG 0.03 08/03/2015    PSADIAG 0.01 10/28/2013       Urine dip showed no blood, leukocyte esterase, and nitrite. Negative protein.     ASSESSMENT     1. Urinary frequency    2. Urge incontinence of urine      PLAN:   1. Urinary frequency  -     PROSTATE SPECIFIC ANTIGEN, DIAGNOSTIC; Future; Expected date: 07/10/2024    2. Urge incontinence of urine    Other orders  -     oxybutynin (DITROPAN) 5 MG Tab; Take 1 tablet (5 mg total) by mouth 3 (three) times daily.  Dispense: 30 tablet; Refill: 11         PSA in 1 yr.   Script for ditropan.   Discontinue myrbetriq as it is not improving urinary symptoms.     ASH ALVARADO MD

## 2023-07-13 ENCOUNTER — PATIENT MESSAGE (OUTPATIENT)
Dept: UROLOGY | Facility: CLINIC | Age: 73
End: 2023-07-13
Payer: MEDICARE

## 2023-07-20 ENCOUNTER — TELEPHONE (OUTPATIENT)
Dept: NEUROLOGY | Facility: CLINIC | Age: 73
End: 2023-07-20
Payer: MEDICARE

## 2023-07-20 NOTE — TELEPHONE ENCOUNTER
----- Message from Nery Keen sent at 7/20/2023  4:23 PM CDT -----  Regarding: Genetic Testing  Contact: Wife @ [phone]  Pt's wife is calling to get a follow up appt for genetic testings kit. Pt's wife is calling to ask that a supervisor call. She have left several messages and no one is returning her calls.

## 2023-08-16 ENCOUNTER — TELEPHONE (OUTPATIENT)
Dept: ENDOSCOPY | Facility: HOSPITAL | Age: 73
End: 2023-08-16
Payer: MEDICARE

## 2023-08-16 ENCOUNTER — OFFICE VISIT (OUTPATIENT)
Dept: GASTROENTEROLOGY | Facility: CLINIC | Age: 73
End: 2023-08-16
Payer: MEDICARE

## 2023-08-16 VITALS — WEIGHT: 210 LBS | HEIGHT: 71 IN | BODY MASS INDEX: 29.4 KG/M2

## 2023-08-16 VITALS — WEIGHT: 210 LBS | BODY MASS INDEX: 29.4 KG/M2 | HEIGHT: 71 IN

## 2023-08-16 DIAGNOSIS — R13.10 DYSPHAGIA, UNSPECIFIED TYPE: Primary | ICD-10-CM

## 2023-08-16 DIAGNOSIS — R74.8 ELEVATED LIVER ENZYMES: ICD-10-CM

## 2023-08-16 DIAGNOSIS — K21.9 GASTROESOPHAGEAL REFLUX DISEASE, UNSPECIFIED WHETHER ESOPHAGITIS PRESENT: ICD-10-CM

## 2023-08-16 DIAGNOSIS — Z79.899 ENCOUNTER FOR MONITORING LONG-TERM PROTON PUMP INHIBITOR THERAPY: ICD-10-CM

## 2023-08-16 DIAGNOSIS — Z51.81 ENCOUNTER FOR MONITORING LONG-TERM PROTON PUMP INHIBITOR THERAPY: ICD-10-CM

## 2023-08-16 PROCEDURE — 99204 OFFICE O/P NEW MOD 45 MIN: CPT | Mod: 95,,, | Performed by: INTERNAL MEDICINE

## 2023-08-16 PROCEDURE — 99204 PR OFFICE/OUTPT VISIT, NEW, LEVL IV, 45-59 MIN: ICD-10-PCS | Mod: 95,,, | Performed by: INTERNAL MEDICINE

## 2023-08-16 NOTE — TELEPHONE ENCOUNTER
Spoke to patient to schedule procedure(s) Upper Endoscopy (EGD)       Physician to perform procedure(s) Dr. NILSA Mann  Date of Procedure (s) 8/21/23  Arrival Time 7:10 AM  Time of Procedure(s) 8:10 AM   Location of Procedure(s) [address]  Type of Rx Prep sent to patient: N/A  Instructions provided to patient via [facility]    Patient was informed on the following information and verbalized understanding. Screening questionnaire reviewed with patient and complete. If procedure requires anesthesia, a responsible adult needs to be present to accompany the patient home, patient cannot drive after receiving anesthesia. Appointment details are tentative, especially check-in time. Patient will receive a prep-op call 4 days prior to confirm check-in time for procedure. If applicable the patient should contact their pharmacy to verify Rx for procedure prep is ready for pick-up. Patient was advised to call the scheduling department at [phone] if pharmacy states no Rx is available. Patient was advised to call the endoscopy scheduling department if any questions or concerns arise.      [facility] Scheduling Department

## 2023-08-16 NOTE — PROGRESS NOTES
The patient location is:  At home  The chief complaint leading to consultation is:  Longstanding heartburn with new onset esophageal dysphagia    Visit type: audiovisual    Face to Face time with patient: 30 minutes of total time spent on the encounter, which includes face to face time and non-face to face time preparing to see the patient (eg, review of tests), Obtaining and/or reviewing separately obtained history, Documenting clinical information in the electronic or other health record, Independently interpreting results (not separately reported) and communicating results to the patient/family/caregiver, or Care coordination (not separately reported).         Each patient to whom he or she provides medical services by telemedicine is:  (1) informed of the relationship between the physician and patient and the respective role of any other health care provider with respect to management of the patient; and (2) notified that he or she may decline to receive medical services by telemedicine and may withdraw from such care at any time.    Notes:         [facility] Consultation Note    Reason for Consult:  The primary encounter diagnosis was Dysphagia, unspecified type. Diagnoses of Gastroesophageal reflux disease, unspecified whether esophagitis present, Elevated liver enzymes, and Encounter for monitoring long-term proton pump inhibitor therapy were also pertinent to this visit.    PCP:   JAMEL Farrar (Inactive)       Referring MD:  No referring provider defined for this encounter.    Initial History of Present Illness (HPI):  This is a 73 y.o. male here for evaluation of chronic heartburn symptoms for about 15 years she is on pantoprazole 40 mg once daily he has new onset of dysphagia for mainly solids over the last 3-4 weeks no weight loss does not smoke was a former smoker has not drunk alcohol in quite some time now burns in the esophagus a little bit no nausea no vomiting no early satiety  no change in bowel habits no chest pain or shortness of breath he has been having some sinus issues with a little throat clearing has not had an EGD and a long long time his colonoscopy is up-to-date no overt GI bleeding no chronic fatigue he is followed by neurology for chronic idiopathic polyneuropathy he does use a brace and a walking aid he has no family history of Barretts esophagus or esophageal cancer stomach cancer no family history of colon cancer nobody with ovarian uterine cancer kidney liver or bladder cancer ureter cancer he is got a maternal uncle in his 70s with pancreatic cancer but nobody else with pancreatic cancer.    Abdominal pain - no  Reflux - as above  Dysphagia - as above  Bowel habits - on average once daily no blood  GI bleeding - none  NSAID usage - none    Interval HPI 08/16/2023:  The patient's last visit with me was on Visit date not found.      ROS:  Constitutional: No fevers, chills, No weight loss  ENT:  As above heartburn as above dysphagia no odynophagia no hoarseness  CV: No chest pain, no palpitation  Pulm: No cough, No shortness of breath, no wheezing  Ophtho: No vision changes  GI: see HPI  Derm: No rash, no itching  Heme: No lymphadenopathy  MSK:  Chronic idiopathic polyneuropathy  : No dysuria, No hematuria  Endo: No hot or cold intolerance  Neuro: No syncope, No seizure, no strokes, chronic idiopathic polyneuropathy followed by Neurology  Psych: No uncontrolled anxiety, No uncontrolled depression    Medical History:  has a past medical history of Hyperlipidemia and Hypertension.    Surgical History:  has a past surgical history that includes Prostate surgery; Cholecystectomy; Hernia repair; Colonoscopy; Colonoscopy (N/A, 7/25/2016); and Neck surgery.    Family History: family history includes Pancreatic cancer (age of onset: 75) in his maternal uncle..     Social History:  reports that he quit smoking about 23 years ago. His smoking use included cigarettes. He has never  used smokeless tobacco. He reports that he does not currently use alcohol after a past usage of about 1.0 standard drink of alcohol per week. He reports that he does not use drugs.    Review of patient's allergies indicates:   Allergen Reactions    Pcn [penicillins] Hives, Swelling and Rash       Medication List with Changes/Refills   Current Medications    ACETAMINOPHEN (TYLENOL) 650 MG TBSR    2 tablets as needed    ATORVASTATIN (LIPITOR) 80 MG TABLET    80 mg.    AZATHIOPRINE (IMURAN) 50 MG TAB    Take 1 tablet (50 mg total) by mouth 2 (two) times daily.    CANNABIDIOL (EPIDIOLEX) 100 MG/ML    as directed    CHOLECALCIFEROL, VITAMIN D3, (VITAMIN D3) 50 MCG (2,000 UNIT) TAB    TAKE 2000UNIT BY MOUTH ONCE DAILY FOR VITAMIN D.  TAKE WITH FOOD.    DULOXETINE (CYMBALTA) 30 MG CAPSULE    Take 3 capsules by mouth once daily.    EZETIMIBE (ZETIA) 10 MG TABLET    Take 10 mg by mouth once daily.    LOSARTAN (COZAAR) 50 MG TABLET    Take 50 mg by mouth once daily.    MULTIVITAMIN (THERAGRAN) PER TABLET    Take 1 tablet by mouth once daily.    OXYBUTYNIN (DITROPAN) 5 MG TAB    Take 1 tablet (5 mg total) by mouth 3 (three) times daily.    PANTOPRAZOLE (PROTONIX) 40 MG TABLET    40 mg.    PAROXETINE (PAXIL) 30 MG TABLET    Take 30 mg by mouth every morning.    PRAZOSIN (MINIPRESS) 5 MG CAPSULE    Take 1 capsule by mouth every evening.    ZINC SULFATE (ZINC-15) 66 MG TAB    2 tablets   Discontinued Medications    AMLODIPINE (NORVASC) 2.5 MG TABLET    Take 2.5 mg by mouth once daily.    CRESTOR 5 MG TABLET        CRESTOR 5 MG TABLET        MECLIZINE (ANTIVERT) 25 MG TABLET        METHOCARBAMOL (ROBAXIN) 750 MG TAB    Take 1 tablet (750 mg total) by mouth 3 (three) times daily.    NAPROXEN (NAPROSYN) 500 MG TABLET        NEXIUM 40 MG CAPSULE        NYSTATIN-TRIAMCINOLONE (MYCOLOG II) CREAM    Apply topically 2 (two) times daily.    OXYBUTYNIN (DITROPAN-XL) 5 MG TR24    Take 2 tablets by mouth once daily.    RANITIDINE (ZANTAC)  "300 MG CAPSULE        SOLIFENACIN (VESICARE) 10 MG TABLET    Take 1 tablet (10 mg total) by mouth once daily.         Objective Findings:    Vital Signs:  Ht 5' 11" (1.803 m)   Wt 95.3 kg (210 lb)   BMI 29.29 kg/m²   Body mass index is 29.29 kg/m².    Physical Exam:  Telemedicine video visit  General Appearance: Well appearing in no acute distress  Psychiatric:  Normal speech mentation and affect    Labs:  Lab Results   Component Value Date    WBC 7.65 10/13/2022    HGB 14.1 10/13/2022    HCT 41.4 10/13/2022     10/13/2022    CHOL 176 08/27/2018    TRIG 125 08/27/2018    HDL 44 08/27/2018    ALT 44 10/13/2022    AST 47 (H) 10/13/2022     10/13/2022    K 4.3 10/13/2022     10/13/2022    CREATININE 0.76 10/13/2022    BUN 12 10/13/2022    CO2 28 10/13/2022    PSA 0.01 04/30/2013    INR 1.0 01/06/2021       Medical Decision Making:  Lab work reviewed  Fasting lab work talk given  Elevated liver enzyme talk given  Esophagram with barium tablet talk given  EGD talk given  Prior colonoscopy report reviewed myself  History obtained from patient and patient's wife today      Assessment:  1. Dysphagia, unspecified type    2. Gastroesophageal reflux disease, unspecified whether esophagitis present    3. Elevated liver enzymes    4. Encounter for monitoring long-term proton pump inhibitor therapy         Recommendations:  1. 12 hour fasting blood work  2. Timed barium esophagram with barium tablet as soon as possible  3. Urgent EGD for evaluation of esophageal dysphagia 1st GI MD available  4. Patient will avoid foods the best that he can that have been difficult to swallow  5. Return to GI clinic 4-6 weeks for follow-up okay for telemedicine video visit sooner if symptoms worsen     Follow up in about 6 weeks (around 9/27/2023).      Order summary:  Orders Placed This Encounter    FL Esophagram With Barium Tablet    Vitamin B12    Vitamin D    CBC Auto Differential    Folate    Hepatic Function Panel    " Basic Metabolic Panel    TSH    Alpha 1 Antitrypsin Phenotype    ARAVIND Screen w/Reflex    Antimitochondrial Antibody    Anti-Smooth Muscle Antibody    Ceruloplasmin    Ferritin    Hepatitis A antibody, IgG    Hepatitis B Surface Antibody, Qual/Quant    IgA    IgG    Iron and TIBC    Tissue Transglutaminase, IgA    Hepatitis C Antibody    Hepatitis B Surface Antigen         Thank you so much for allowing me to participate in the care of Antione Torres    Eliazar Purdy MD    DISCLAIMER: This note was prepared with emoquo voice recognition transcription software. Garbled syntax, mangled or inadvertent pronouns, and other bizarre constructions may be attributed to that software system. While efforts were made to correct any mistakes made by this voice recognition program, some errors and/or omissions may remain in the note that were missed when the note was originally created.

## 2023-08-16 NOTE — TELEPHONE ENCOUNTER
"----- Message from Rosina Molina sent at 8/16/2023  8:51 AM CDT -----    ----- Message -----  From: Eliazar Purdy MD  Sent: 8/16/2023   8:23 AM CDT  To: [facility] Patients    Important:    GI MA team please schedule Antione for 12 hour fasting blood work tomorrow at 8:00 a.m. [facility] orders have been placed    Please schedule patient for esophagram with barium tablet as soon as possible orders placed    Referral to endoscopy schedulers have been placed for an urgent EGD for evaluation of dysphagia    Please schedule patient return to GI clinic with me 4-6 weeks telemedicine video visit    Thank you    Procedure: EGD    Diagnosis: Dysphagia    Procedure Timing: < 2 week    #If within 4 weeks selected, please lokesh as high priority#    #If greater than 12 weeks, please select "5-12 weeks" and delay sending until 2 months prior to requested date#    Provider: Any endoscopist    Location: No Preference    Additional Scheduling Information: No scheduling concerns    Prep Specifications:Standard prep    Is the patient taking a GLP-1 Agonist:yes    Have you attached a patient to this message: yes     "

## 2023-08-16 NOTE — PATIENT INSTRUCTIONS
"Important:    GI MA team please schedule Antione for 12 hour fasting blood work tomorrow at 8:00 a.m. [facility] orders have been placed    Please schedule patient for esophagram with barium tablet as soon as possible orders placed    Referral to endoscopy schedulers have been placed for an urgent EGD for evaluation of dysphagia    Please schedule patient return to GI clinic with me 4-6 weeks telemedicine video visit    Thank you    Procedure: EGD    Diagnosis: Dysphagia    Procedure Timing: < 2 week    *If within 4 weeks selected, please lokesh as high priority*    *If greater than 12 weeks, please select "5-12 weeks" and delay sending until 2 months prior to requested date*    Provider: Any endoscopist    Location: No Preference    Additional Scheduling Information: No scheduling concerns    Prep Specifications:Standard prep    Is the patient taking a GLP-1 Agonist:yes    Have you attached a patient to this message: yes   "

## 2023-08-16 NOTE — Clinical Note
Important:  GI MA team please schedule Antione for 12 hour fasting blood work tomorrow at 8:00 a.m. [facility] orders have been placed  Please schedule patient for esophagram with barium tablet as soon as possible orders placed  Referral to endoscopy schedulers have been placed for an urgent EGD for evaluation of dysphagia  Please schedule patient return to GI clinic with me 4-6 weeks telemedicine video visit  Thank you

## 2023-08-16 NOTE — Clinical Note
"Important:  GI MA team please schedule Atnione for 12 hour fasting blood work tomorrow at 8:00 a.m. [facility] orders have been placed  Please schedule patient for esophagram with barium tablet as soon as possible orders placed  Referral to endoscopy schedulers have been placed for an urgent EGD for evaluation of dysphagia  Please schedule patient return to GI clinic with me 4-6 weeks telemedicine video visit  Thank you  Procedure: EGD  Diagnosis: Dysphagia  Procedure Timing: < 2 week  *If within 4 weeks selected, please lokesh as high priority*  *If greater than 12 weeks, please select "5-12 weeks" and delay sending until 2 months prior to requested date*  Provider: Any endoscopist  Location: No Preference  Additional Scheduling Information: No scheduling concerns  Prep Specifications:Standard prep  Is the patient taking a GLP-1 Agonist:yes  Have you attached a patient to this message: yes "

## 2023-08-21 ENCOUNTER — HOSPITAL ENCOUNTER (OUTPATIENT)
Facility: HOSPITAL | Age: 73
Discharge: HOME | End: 2023-08-21
Attending: INTERNAL MEDICINE | Admitting: INTERNAL MEDICINE
Payer: MEDICARE

## 2023-08-21 ENCOUNTER — ANESTHESIA (OUTPATIENT)
Dept: ENDOSCOPY | Facility: HOSPITAL | Age: 73
End: 2023-08-21
Payer: MEDICARE

## 2023-08-21 ENCOUNTER — ANESTHESIA EVENT (OUTPATIENT)
Dept: ENDOSCOPY | Facility: HOSPITAL | Age: 73
End: 2023-08-21
Payer: MEDICARE

## 2023-08-21 VITALS
HEIGHT: 71 IN | OXYGEN SATURATION: 96 % | SYSTOLIC BLOOD PRESSURE: 137 MMHG | RESPIRATION RATE: 17 BRPM | WEIGHT: 210 LBS | HEART RATE: 60 BPM | TEMPERATURE: 98 F | BODY MASS INDEX: 29.4 KG/M2 | DIASTOLIC BLOOD PRESSURE: 61 MMHG

## 2023-08-21 DIAGNOSIS — R13.10 DYSPHAGIA, UNSPECIFIED TYPE: Primary | ICD-10-CM

## 2023-08-21 DIAGNOSIS — R13.10 DYSPHAGIA: ICD-10-CM

## 2023-08-21 PROCEDURE — 88305 TISSUE EXAM BY PATHOLOGIST: CPT | Performed by: PATHOLOGY

## 2023-08-21 PROCEDURE — 25000003 PHARM REV CODE 250: Performed by: NURSE ANESTHETIST, CERTIFIED REGISTERED

## 2023-08-21 PROCEDURE — 88305 TISSUE EXAM BY PATHOLOGIST: ICD-10-PCS | Mod: 26,,, | Performed by: PATHOLOGY

## 2023-08-21 PROCEDURE — E9220 PRA ENDO ANESTHESIA: ICD-10-PCS | Mod: ,,, | Performed by: NURSE ANESTHETIST, CERTIFIED REGISTERED

## 2023-08-21 PROCEDURE — 27201012 HC FORCEPS, HOT/COLD, DISP: Performed by: INTERNAL MEDICINE

## 2023-08-21 PROCEDURE — E9220 PRA ENDO ANESTHESIA: HCPCS | Mod: ,,, | Performed by: NURSE ANESTHETIST, CERTIFIED REGISTERED

## 2023-08-21 PROCEDURE — 63600175 PHARM REV CODE 636 W HCPCS: Performed by: NURSE ANESTHETIST, CERTIFIED REGISTERED

## 2023-08-21 PROCEDURE — 88305 TISSUE EXAM BY PATHOLOGIST: CPT | Mod: 26,,, | Performed by: PATHOLOGY

## 2023-08-21 PROCEDURE — 43239 PR EGD, FLEX, W/BIOPSY, SGL/MULTI: ICD-10-PCS | Mod: ,,, | Performed by: INTERNAL MEDICINE

## 2023-08-21 PROCEDURE — 37000009 HC ANESTHESIA EA ADD 15 MINS: Performed by: INTERNAL MEDICINE

## 2023-08-21 PROCEDURE — 43239 EGD BIOPSY SINGLE/MULTIPLE: CPT | Performed by: INTERNAL MEDICINE

## 2023-08-21 PROCEDURE — 43239 EGD BIOPSY SINGLE/MULTIPLE: CPT | Mod: ,,, | Performed by: INTERNAL MEDICINE

## 2023-08-21 PROCEDURE — 37000008 HC ANESTHESIA 1ST 15 MINUTES: Performed by: INTERNAL MEDICINE

## 2023-08-21 RX ORDER — LIDOCAINE HYDROCHLORIDE 20 MG/ML
INJECTION INTRAVENOUS
Status: DISCONTINUED | OUTPATIENT
Start: 2023-08-21 | End: 2023-08-21

## 2023-08-21 RX ORDER — PROPOFOL 10 MG/ML
VIAL (ML) INTRAVENOUS
Status: DISCONTINUED | OUTPATIENT
Start: 2023-08-21 | End: 2023-08-21

## 2023-08-21 RX ORDER — SODIUM CHLORIDE, SODIUM LACTATE, POTASSIUM CHLORIDE, CALCIUM CHLORIDE 600; 310; 30; 20 MG/100ML; MG/100ML; MG/100ML; MG/100ML
INJECTION, SOLUTION INTRAVENOUS CONTINUOUS PRN
Status: DISCONTINUED | OUTPATIENT
Start: 2023-08-21 | End: 2023-08-21

## 2023-08-21 RX ORDER — SODIUM CHLORIDE 9 MG/ML
INJECTION, SOLUTION INTRAVENOUS CONTINUOUS
Status: DISCONTINUED | OUTPATIENT
Start: 2023-08-21 | End: 2023-08-21 | Stop reason: HOSPADM

## 2023-08-21 RX ADMIN — PROPOFOL 150 MG: 10 INJECTION, EMULSION INTRAVENOUS at 08:08

## 2023-08-21 RX ADMIN — SODIUM CHLORIDE, SODIUM LACTATE, POTASSIUM CHLORIDE, AND CALCIUM CHLORIDE: 600; 310; 30; 20 INJECTION, SOLUTION INTRAVENOUS at 08:08

## 2023-08-21 RX ADMIN — LIDOCAINE HYDROCHLORIDE 75 MG: 20 INJECTION INTRAVENOUS at 08:08

## 2023-08-21 NOTE — H&P
Short Stay Endoscopy History and Physical    PCP - JAMEL Farrar MD (Inactive)  Referring Physician - Eliazar Purdy MD  [address] [place] [address]    Procedure - EGD  ASA - per anesthesia  Mallampati - per anesthesia  History of Anesthesia problems - no  Family history Anesthesia problems -  no   Plan of anesthesia - General    HPI  73 y.o. male  Reason for procedure:  Dysphagia- globus sensation- solids and liquids  GERD- on protonix daily for years  No other GI symptoms, never had previous EGD    ROS:  Constitutional: No fevers, chills, No weight loss  CV: No chest pain  Pulm: No cough, No shortness of breath  GI: see HPI    Medical History:  has a past medical history of Hyperlipidemia and Hypertension.    Surgical History:  has a past surgical history that includes Prostate surgery; Cholecystectomy; Hernia repair; Colonoscopy; Colonoscopy (N/A, 7/25/2016); and Neck surgery.    Family History: family history includes Pancreatic cancer (age of onset: 75) in his maternal uncle..    Social History:  reports that he quit smoking about 23 years ago. His smoking use included cigarettes. He has never used smokeless tobacco. He reports that he does not currently use alcohol after a past usage of about 1.0 standard drink of alcohol per week. He reports that he does not use drugs.    Review of patient's allergies indicates:   Allergen Reactions    Pcn [penicillins] Hives, Swelling and Rash       Medications:   Medications Prior to Admission   Medication Sig Dispense Refill Last Dose    acetaminophen (TYLENOL) 650 MG TbSR 2 tablets as needed   8/20/2023    atorvastatin (LIPITOR) 80 MG tablet 80 mg.   8/20/2023    azaTHIOprine (IMURAN) 50 mg Tab Take 1 tablet (50 mg total) by mouth 2 (two) times daily. 60 tablet 11 8/20/2023    cannabidioL (EPIDIOLEX) 100 mg/mL as directed   8/20/2023    cholecalciferol, vitamin D3, (VITAMIN D3) 50 mcg (2,000 unit) Tab TAKE 2000UNIT BY MOUTH ONCE DAILY FOR VITAMIN D.   TAKE WITH FOOD.   8/20/2023    DULoxetine (CYMBALTA) 30 MG capsule Take 3 capsules by mouth once daily.   8/20/2023    ezetimibe (ZETIA) 10 mg tablet Take 10 mg by mouth once daily.   8/20/2023    losartan (COZAAR) 50 MG tablet Take 50 mg by mouth once daily.   8/20/2023    multivitamin (THERAGRAN) per tablet Take 1 tablet by mouth once daily.   8/20/2023    oxybutynin (DITROPAN) 5 MG Tab Take 1 tablet (5 mg total) by mouth 3 (three) times daily. 30 tablet 11 8/20/2023    pantoprazole (PROTONIX) 40 MG tablet 40 mg.   8/20/2023    paroxetine (PAXIL) 30 MG tablet Take 30 mg by mouth every morning.   8/20/2023    prazosin (MINIPRESS) 5 MG capsule Take 1 capsule by mouth every evening.   8/20/2023    zinc sulfate (ZINC-15) 66 mg Tab 2 tablets   8/20/2023       Physical Exam:    Vital Signs:   Vitals:    08/21/23 0715   BP: (!) 158/75   Pulse: 62   Resp: 16   Temp: 98.1 °F (36.7 °C)       General Appearance: Well appearing in no acute distress  Abdomen: Soft, non tender, non distended with normal bowel sounds, no masses      Labs:  Lab Results   Component Value Date    WBC 5.90 08/17/2023    HGB 13.7 (L) 08/17/2023    HCT 39.9 (L) 08/17/2023     08/17/2023    CHOL 176 08/27/2018    TRIG 125 08/27/2018    HDL 44 08/27/2018    ALT 37 08/17/2023    AST 33 08/17/2023     08/17/2023    K 3.9 08/17/2023     08/17/2023    CREATININE 0.61 08/17/2023    BUN 9 08/17/2023    CO2 26 08/17/2023    TSH 3.000 08/17/2023    PSA 0.01 04/30/2013    INR 1.0 01/06/2021       I have explained the risks and benefits of this endoscopic procedure to the patient including but not limited to bleeding, inflammation, infection, perforation, and death.      Nasir Mann MD

## 2023-08-21 NOTE — ANESTHESIA PREPROCEDURE EVALUATION
08/21/2023  Antione Torres is a 73 y.o., male.      Pre-op Assessment    I have reviewed the Patient Summary Reports.     I have reviewed the Nursing Notes. I have reviewed the NPO Status.   I have reviewed the Medications.     Review of Systems  Anesthesia Hx:  No previous Anesthesia    Hematology/Oncology:  Hematology Normal   Oncology Normal     EENT/Dental:EENT/Dental Normal   Cardiovascular:   Exercise tolerance: good    Pulmonary:  Pulmonary Normal    Renal/:  Renal/ Normal     Musculoskeletal:  Musculoskeletal Normal    Endocrine:  Endocrine Normal    Dermatological:  Skin Normal    Psych:  Psychiatric Normal           Physical Exam  General: Well nourished, Cooperative, Alert and Oriented    Airway:  Mallampati: III / II  TM Distance: Normal  Neck ROM: Normal ROM    Dental:  Intact        Anesthesia Plan  Type of Anesthesia, risks & benefits discussed:    Anesthesia Type: Gen Natural Airway  Intra-op Monitoring Plan: Standard ASA Monitors  Post Op Pain Control Plan: multimodal analgesia  Induction:  IV  Informed Consent: Informed consent signed with the Patient and all parties understand the risks and agree with anesthesia plan.  All questions answered.   ASA Score: 3  Day of Surgery Review of History & Physical: H&P Update referred to the surgeon/provider.I have interviewed and examined the patient. I have reviewed the patient's H&P dated: tb.     Ready For Surgery From Anesthesia Perspective.     .

## 2023-08-21 NOTE — ANESTHESIA POSTPROCEDURE EVALUATION
Anesthesia Post Evaluation    Patient: Antione Torres    Procedure(s) Performed: Procedure(s) (LRB):  EGD (ESOPHAGOGASTRODUODENOSCOPY) (N/A)    Final Anesthesia Type: general      Patient location during evaluation: GI PACU  Patient participation: Yes- Able to Participate  Level of consciousness: awake and alert  Post-procedure vital signs: reviewed and stable  Pain management: adequate  Airway patency: patent    PONV status at discharge: No PONV  Anesthetic complications: no      Cardiovascular status: hemodynamically stable  Respiratory status: unassisted, spontaneous ventilation and room air  Hydration status: euvolemic  Follow-up not needed.          Vitals Value Taken Time   /61 08/21/23 0901   Temp 36.5 °C (97.7 °F) 08/21/23 0831   Pulse 60 08/21/23 0901   Resp 17 08/21/23 0901   SpO2 96 % 08/21/23 0901         Event Time   Out of Recovery 09:11:05         Pain/Kayleigh Score: Kayleigh Score: 10 (8/21/2023  9:01 AM)

## 2023-08-21 NOTE — TRANSFER OF CARE
"Anesthesia Transfer of Care Note    Patient: Antione Torres    Procedure(s) Performed: Procedure(s) (LRB):  EGD (ESOPHAGOGASTRODUODENOSCOPY) (N/A)    Patient location: PACU    Anesthesia Type: general    Transport from OR: Transported from OR on room air with adequate spontaneous ventilation    Post pain: adequate analgesia    Post assessment: no apparent anesthetic complications    Post vital signs: stable    Level of consciousness: awake, alert and oriented    Nausea/Vomiting: no nausea/vomiting    Complications: none    Transfer of care protocol was followed      Last vitals:   Visit Vitals  BP (!) 114/56   Pulse 67   Temp 36.5 °C (97.7 °F)   Resp 15   Ht 5' 11" (1.803 m)   Wt 95.3 kg (210 lb)   SpO2 95%   BMI 29.29 kg/m²     "

## 2023-08-21 NOTE — PROVATION PATIENT INSTRUCTIONS
Discharge Summary/Instructions after an Endoscopic Procedure  Patient Name: Antione Torres  Patient MRN: [id number]  Patient YOB: 1950  Monday, August 21, 2023  Nasir Mann MD  Dear patient,  As a result of recent federal legislation (The Federal Cures Act), you may   receive lab or pathology results from your procedure in your [facility]   account before your physician is able to contact you. Your physician or   their representative will relay the results to you with their   recommendations at their soonest availability.  Thank you,  RESTRICTIONS:  During your procedure today, you received medications for sedation.  These   medications may affect your judgment, balance and coordination.  Therefore,   for 24 hours, you have the following restrictions:   - DO NOT drive a car, operate machinery, make legal/financial decisions,   sign important papers or drink alcohol.    ACTIVITY:  Today: no heavy lifting, straining or running due to procedural   sedation/anesthesia.  The following day: return to full activity including work.  DIET:  Eat and drink normally unless instructed otherwise.     TREATMENT FOR COMMON SIDE EFFECTS:  - Mild abdominal pain, nausea, belching, bloating or excessive gas:  rest,   eat lightly and use a heating pad.  - Sore Throat: treat with throat lozenges and/or gargle with warm salt   water.  - Because air was used during the procedure, expelling large amounts of air   from your rectum or belching is normal.  - If a bowel prep was taken, you may not have a bowel movement for 1-3 days.    This is normal.  SYMPTOMS TO WATCH FOR AND REPORT TO YOUR PHYSICIAN:  1. Abdominal pain or bloating, other than gas cramps.  2. Chest pain.  3. Back pain.  4. Signs of infection such as: chills or fever occurring within 24 hours   after the procedure.  5. Rectal bleeding, which would show as bright red, maroon, or black stools.   (A tablespoon of blood from the rectum is not serious, especially if    hemorrhoids are present.)  6. Vomiting.  7. Weakness or dizziness.  GO DIRECTLY TO THE NEAREST EMERGENCY ROOM IF YOU HAVE ANY OF THE FOLLOWING:      Difficulty breathing              Chills and/or fever over 101 F   Persistent vomiting and/or vomiting blood   Severe abdominal pain   Severe chest pain   Black, tarry stools   Bleeding- more than one tablespoon   Any other symptom or condition that you feel may need urgent attention  Your doctor recommends these additional instructions:  If any biopsies were taken, your doctors clinic will contact you in 1 to 2   weeks with any results.  - Discharge patient to home.   - Patient has a contact number available for emergencies.  The signs and   symptoms of potential delayed complications were discussed with the   patient.  Return to normal activities tomorrow.  Written discharge   instructions were provided to the patient.   - Resume previous diet.   - Defer to primary GI provider if pantoprazole 40 mg BID may help with   heartburn and globus sensation. May need further workup for dysphagia.   - Await pathology results.   For questions, problems or results please call your physician - Nasir Mann MD at Work:  [phone].  [facility], EMERGENCY ROOM PHONE NUMBER: [phone]  IF A COMPLICATION OR EMERGENCY SITUATION ARISES AND YOU ARE UNABLE TO REACH   YOUR PHYSICIAN - GO DIRECTLY TO THE EMERGENCY ROOM.  Nasir Mann MD  8/21/2023 8:27:58 AM  This report has been verified and signed electronically.  Dear patient,  As a result of recent federal legislation (The Federal Cures Act), you may   receive lab or pathology results from your procedure in your [facility]   account before your physician is able to contact you. Your physician or   their representative will relay the results to you with their   recommendations at their soonest availability.  Thank you,  PROVATION

## 2023-08-21 NOTE — ANESTHESIA RELEASE NOTE
"Anesthesia Release from PACU Note    Patient: Antione Torres    Procedure(s) Performed: Procedure(s) (LRB):  EGD (ESOPHAGOGASTRODUODENOSCOPY) (N/A)    Anesthesia type: general    Post pain: Adequate analgesia    Post assessment: no apparent anesthetic complications and tolerated procedure well    Last Vitals:   Visit Vitals  /61   Pulse 60   Temp 36.5 °C (97.7 °F)   Resp 17   Ht 5' 11" (1.803 m)   Wt 95.3 kg (210 lb)   SpO2 96%   BMI 29.29 kg/m²       Post vital signs: stable    Level of consciousness: awake and alert     Nausea/Vomiting: no nausea/no vomiting    Complications: none    Airway Patency: patent    Respiratory: unassisted, spontaneous ventilation, room air    Cardiovascular: stable and blood pressure at baseline    Hydration: euvolemic  "

## 2023-08-23 ENCOUNTER — PATIENT MESSAGE (OUTPATIENT)
Dept: GASTROENTEROLOGY | Facility: CLINIC | Age: 73
End: 2023-08-23
Payer: MEDICARE

## 2023-08-23 LAB
FINAL PATHOLOGIC DIAGNOSIS: NORMAL
GROSS: NORMAL
Lab: NORMAL

## 2023-09-06 ENCOUNTER — OFFICE VISIT (OUTPATIENT)
Dept: GASTROENTEROLOGY | Facility: CLINIC | Age: 73
End: 2023-09-06
Payer: MEDICARE

## 2023-09-06 DIAGNOSIS — D64.9 LOW HEMOGLOBIN: ICD-10-CM

## 2023-09-06 DIAGNOSIS — K21.9 GASTROESOPHAGEAL REFLUX DISEASE, UNSPECIFIED WHETHER ESOPHAGITIS PRESENT: ICD-10-CM

## 2023-09-06 DIAGNOSIS — Z23 ENCOUNTER FOR VACCINATION: ICD-10-CM

## 2023-09-06 DIAGNOSIS — R13.10 DYSPHAGIA, UNSPECIFIED TYPE: Primary | ICD-10-CM

## 2023-09-06 DIAGNOSIS — C61 CARCINOMA OF PROSTATE: ICD-10-CM

## 2023-09-06 PROCEDURE — 99215 OFFICE O/P EST HI 40 MIN: CPT | Mod: 95,,, | Performed by: INTERNAL MEDICINE

## 2023-09-06 PROCEDURE — 99215 PR OFFICE/OUTPT VISIT, EST, LEVL V, 40-54 MIN: ICD-10-PCS | Mod: 95,,, | Performed by: INTERNAL MEDICINE

## 2023-09-06 RX ORDER — RABEPRAZOLE SODIUM 20 MG/1
20 TABLET, DELAYED RELEASE ORAL
Qty: 90 TABLET | Refills: 1 | Status: SHIPPED | OUTPATIENT
Start: 2023-09-06 | End: 2023-12-07 | Stop reason: SDUPTHER

## 2023-09-06 NOTE — Clinical Note
Yuliya please schedule patient for EPA lateral chest x-ray in the same day but about an hour prior to his timed barium esophagram with barium tablet for further evaluation of his dysphagia orders have been placed again  Referral to endoscopy schedulers for esophageal manometry is been placed  Please have patient return to GI clinic 4 weeks for follow-up okay for telemedicine video visit  Thank you

## 2023-09-06 NOTE — Clinical Note
"Procedure: Colonoscopy and EGD  Diagnosis: Screening colonoscopy, anemia, dysphagia.  Procedure Timin-12 weeks  *If within 4 weeks selected, please lokesh as high priority*  *If greater than 12 weeks, please select "5-12 weeks" and delay sending until 2 months prior to requested date*  Provider: Myself  Location: [address]  Additional Scheduling Information: No scheduling concerns  Prep Specifications:Standard prep  Is the patient taking a GLP-1 Agonist:no  Have you attached a patient to this message: yes  "

## 2023-09-06 NOTE — PROGRESS NOTES
The patient location is:  At home  The chief complaint leading to consultation is:  GERD intermittent proximal esophageal dysphagia without globus    Visit type: audiovisual    Face to Face time with patient: 40 minutes of total time spent on the encounter, which includes face to face time and non-face to face time preparing to see the patient (eg, review of tests), Obtaining and/or reviewing separately obtained history, Documenting clinical information in the electronic or other health record, Independently interpreting results (not separately reported) and communicating results to the patient/family/caregiver, or Care coordination (not separately reported).       Each patient to whom he or she provides medical services by telemedicine is:  (1) informed of the relationship between the physician and patient and the respective role of any other health care provider with respect to management of the patient; and (2) notified that he or she may decline to receive medical services by telemedicine and may withdraw from such care at any time.    Notes:         [facility] Consultation Note    Reason for Consult:  The primary encounter diagnosis was Dysphagia, unspecified type. A diagnosis of Gastroesophageal reflux disease, unspecified whether esophagitis present was also pertinent to this visit.    PCP:   JAMEL Farrar (Inactive)       Referring MD:  No referring provider defined for this encounter.    Initial History of Present Illness (HPI):  This is a 73 y.o. male here for evaluation of chronic heartburn symptoms for about 15 years she is on pantoprazole 40 mg once daily he has new onset of dysphagia for mainly solids over the last 3-4 weeks no weight loss does not smoke was a former smoker has not drunk alcohol in quite some time now burns in the esophagus a little bit no nausea no vomiting no early satiety no change in bowel habits no chest pain or shortness of breath he has been having some  sinus issues with a little throat clearing has not had an EGD and a long long time his colonoscopy is up-to-date no overt GI bleeding no chronic fatigue he is followed by neurology for chronic idiopathic polyneuropathy he does use a brace and a walking aid he has no family history of Barretts esophagus or esophageal cancer stomach cancer no family history of colon cancer nobody with ovarian uterine cancer kidney liver or bladder cancer ureter cancer he is got a maternal uncle in his 70s with pancreatic cancer but nobody else with pancreatic cancer.     Abdominal pain - no  Reflux - as above  Dysphagia - as above  Bowel habits - on average once daily no blood  GI bleeding - none  NSAID usage - none     Interval HPI 08/16/2023:  The patient's last visit with me was on Visit date not found.        ROS:  Constitutional: No fevers, chills, No weight loss  ENT:  As above heartburn as above dysphagia no odynophagia no hoarseness  CV: No chest pain, no palpitation  Pulm: No cough, No shortness of breath, no wheezing  Ophtho: No vision changes  GI: see HPI  Derm: No rash, no itching  Heme: No lymphadenopathy  MSK:  Chronic idiopathic polyneuropathy  : No dysuria, No hematuria  Endo: No hot or cold intolerance  Neuro: No syncope, No seizure, no strokes, chronic idiopathic polyneuropathy followed by Neurology  Psych: No uncontrolled anxiety, No uncontrolled depression         Interval HPI 09/06/2023:  The patient's last visit with me was on 8/16/2023.      Medical History:  has a past medical history of Hyperlipidemia and Hypertension.    Surgical History:  has a past surgical history that includes Prostate surgery; Cholecystectomy; Hernia repair; Colonoscopy; Colonoscopy (N/A, 7/25/2016); Neck surgery; and Esophagogastroduodenoscopy (N/A, 8/21/2023).    Family History: family history includes Pancreatic cancer (age of onset: 75) in his maternal uncle..     Social History:  reports that he quit smoking about 23 years ago.  His smoking use included cigarettes. He has never used smokeless tobacco. He reports that he does not currently use alcohol after a past usage of about 1.0 standard drink of alcohol per week. He reports that he does not use drugs.    Review of patient's allergies indicates:   Allergen Reactions    Pcn [penicillins] Hives, Swelling and Rash       Medication List with Changes/Refills   New Medications    RABEPRAZOLE (ACIPHEX) 20 MG TABLET    Take 1 tablet (20 mg total) by mouth before breakfast.   Current Medications    ACETAMINOPHEN (TYLENOL) 650 MG TBSR    2 tablets as needed    ATORVASTATIN (LIPITOR) 80 MG TABLET    80 mg.    AZATHIOPRINE (IMURAN) 50 MG TAB    Take 1 tablet (50 mg total) by mouth 2 (two) times daily.    CANNABIDIOL (EPIDIOLEX) 100 MG/ML    as directed    CHOLECALCIFEROL, VITAMIN D3, (VITAMIN D3) 50 MCG (2,000 UNIT) TAB    TAKE 2000UNIT BY MOUTH ONCE DAILY FOR VITAMIN D.  TAKE WITH FOOD.    DULOXETINE (CYMBALTA) 30 MG CAPSULE    Take 3 capsules by mouth once daily.    EZETIMIBE (ZETIA) 10 MG TABLET    Take 10 mg by mouth once daily.    LOSARTAN (COZAAR) 50 MG TABLET    Take 50 mg by mouth once daily.    MULTIVITAMIN (THERAGRAN) PER TABLET    Take 1 tablet by mouth once daily.    OXYBUTYNIN (DITROPAN) 5 MG TAB    Take 1 tablet (5 mg total) by mouth 3 (three) times daily.    PAROXETINE (PAXIL) 30 MG TABLET    Take 30 mg by mouth every morning.    PRAZOSIN (MINIPRESS) 5 MG CAPSULE    Take 1 capsule by mouth every evening.    ZINC SULFATE (ZINC-15) 66 MG TAB    2 tablets   Discontinued Medications    PANTOPRAZOLE (PROTONIX) 40 MG TABLET    40 mg.         Objective Findings:    Vital Signs:  There were no vitals taken for this visit.  There is no height or weight on file to calculate BMI.    Physical Exam:  Telemedicine video visit  General Appearance: Well appearing in no acute distress  Eyes:    No scleral icterus  Neurologic:  Alert and oriented x4  Psychiatric:  Normal speech mentation and  affect    Labs:  Lab Results   Component Value Date    WBC 5.90 08/17/2023    HGB 13.7 (L) 08/17/2023    HCT 39.9 (L) 08/17/2023     08/17/2023    CHOL 176 08/27/2018    TRIG 125 08/27/2018    HDL 44 08/27/2018    ALT 37 08/17/2023    AST 33 08/17/2023     08/17/2023    K 3.9 08/17/2023     08/17/2023    CREATININE 0.61 08/17/2023    BUN 9 08/17/2023    CO2 26 08/17/2023    TSH 3.000 08/17/2023    PSA 0.01 04/30/2013    INR 1.0 01/06/2021           Medical Decision Making:  Lab work reviewed  Esophagram with barium tablet talk given  Esophageal motility talk given EGD with potential for EGD with esophageal biopsy talk given  Prior colonoscopy report reviewed myself  History obtained from patient and patient's wife today  Prior EGD images and path personally reviewed by myself no H pylori no small-bowel or esophageal biopsies taken  Switching from pantoprazole to AcipHex talk given good Rx talk given new prescription sent for AcipHex 20 mg once daily  The Olympus scope                          GIF- ([id number]) was introduced through the                          mouth, and advanced to the second part of duodenum.   Findings:        The esophagus was normal.        Patchy mildly erythematous mucosa was found in the gastric antrum.        Biopsies were taken with a cold forceps for Helicobacter pylori        testing. Estimated blood loss was minimal.        The examined duodenum was normal.   Impression:            - Normal esophagus. Biopsies not taken due to                          patient with globus sensation, dysphagia to solids                          and liquids and no evidence of EoE with normal                          esophagus.                          - Erythematous mucosa in the antrum. Biopsied.                          - Normal examined duodenum.   Recommendation:        - Discharge patient to home.                          - Patient has a contact number available for                           emergencies. The signs and symptoms of potential                          delayed complications were discussed with the                          patient. Return to normal activities tomorrow.                          Written discharge instructions were provided to                          the patient.                          - Resume previous diet.                          - Defer to primary GI provider if pantoprazole 40                          mg BID may help with heartburn and globus                          sensation. May need further workup for dysphagia.                          - Await pathology results.   Attending Participation:        I personally performed the entire procedure.   Nasir Mann MD   8/21/2023    STOMACH, BIOPSY:   Gastric body and antral mucosa with mild chronic gastritis and reactive/regenerative changes   No evidence of intestinal metaplasia, dysplasia or malignancy   No evidence of Helicobacter pylori organisms on H&E stain    Comment: Interp By Suzi Perez M.D., Signed on 08/23/2023     Assessment:  1. Dysphagia, unspecified type    2. Gastroesophageal reflux disease, unspecified whether esophagitis present    3.      Low hemoglobin     Recommendations:  1. Dyspnea continue pantoprazole start AcipHex 20 mg once daily best taken 45-60 minutes before your 1st protein meal of the day for GERD symptoms.  2. Schedule EPA lateral chest x-ray and esophageal barium swallow timed with barium tablet for further evaluation of his proximal esophageal intermittent solid food dysphagia without globus.  3. Referral placed to esophageal motility for evaluation of dysphagia    4. Referral to endoscopy schedulers for colonoscopy for screening and evaluation of slight anemia in light dysphagia with no esophageal biopsies will add EGD on for esophageal biopsies of the same time if above evaluation is unrevealing.  5. Referral for hepatitis B vaccination series  6. Return to GI clinic 4  weeks for follow-up to discuss esophagram and chest X results.    Follow up in about 4 weeks (around 10/4/2023).      Order summary:  Orders Placed This Encounter    X-Ray Chest PA And Lateral    FL Esophagram With Barium Tablet    RABEprazole (ACIPHEX) 20 mg tablet         Thank you so much for allowing me to participate in the care of Antione Torres    Eliazar Purdy MD    DISCLAIMER: This note was prepared with [employer] voice recognition transcription software. Garbled syntax, mangled or inadvertent pronouns, and other bizarre constructions may be attributed to that software system. While efforts were made to correct any mistakes made by this voice recognition program, some errors and/or omissions may remain in the note that were missed when the note was originally created.

## 2023-09-06 NOTE — PATIENT INSTRUCTIONS
"Procedure:  Esophageal motility    Diagnosis:  Intermittent solid food Dysphagia    Procedure Timin-6 weeks    *If within 4 weeks selected, please lokesh as high priority*    *If greater than 12 weeks, please select "5-12 weeks" and delay sending until 2 months prior to requested date*    Provider: Any GI provider    Location: [address]    Additional Scheduling Information: No scheduling concerns    Prep Specifications:Standard prep    Is the patient taking a GLP-1 Agonist:no    Have you attached a patient to this message: yes       Procedure: EGD/Colonoscopy    Diagnosis: Screening colonoscopy, anemia, dysphagia    Procedure Timin-12 weeks    *If within 4 weeks selected, please lokesh as high priority*    *If greater than 12 weeks, please select "5-12 weeks" and delay sending until 2 months prior to requested date*    Provider: Myself    Location: [address]    Additional Scheduling Information: No scheduling concerns    Prep Specifications:Standard prep    Is the patient taking a GLP-1 Agonist:no    Have you attached a patient to this message: yes   "

## 2023-09-06 NOTE — Clinical Note
"Procedure:  Please do esophageal motility for evaluation of dysphagia intermittent solid food dysphagia proximal esophageal region  Diagnosis: Dysphagia  Procedure Timin-6 weeks  *If within 4 weeks selected, please lokesh as high priority*  *If greater than 12 weeks, please select "5-12 weeks" and delay sending until 2 months prior to requested date*  Provider: Any GI provider  Location: [address]  Additional Scheduling Information: No scheduling concerns  Prep Specifications:Standard prep  Is the patient taking a GLP-1 Agonist:no  Have you attached a patient to this message: yes "

## 2023-09-08 ENCOUNTER — TELEPHONE (OUTPATIENT)
Dept: ENDOSCOPY | Facility: HOSPITAL | Age: 73
End: 2023-09-08
Payer: MEDICARE

## 2023-09-13 ENCOUNTER — HOSPITAL ENCOUNTER (OUTPATIENT)
Dept: RADIOLOGY | Facility: HOSPITAL | Age: 73
Discharge: HOME | End: 2023-09-13
Attending: INTERNAL MEDICINE
Payer: MEDICARE

## 2023-09-13 DIAGNOSIS — R13.10 DYSPHAGIA, UNSPECIFIED TYPE: ICD-10-CM

## 2023-09-13 PROCEDURE — 74220 X-RAY XM ESOPHAGUS 1CNTRST: CPT | Mod: 26,,, | Performed by: INTERNAL MEDICINE

## 2023-09-13 PROCEDURE — 71046 X-RAY EXAM CHEST 2 VIEWS: CPT | Mod: 26,,, | Performed by: RADIOLOGY

## 2023-09-13 PROCEDURE — 71046 XR CHEST PA AND LATERAL: ICD-10-PCS | Mod: 26,,, | Performed by: RADIOLOGY

## 2023-09-13 PROCEDURE — 25500020 PHARM REV CODE 255: Performed by: INTERNAL MEDICINE

## 2023-09-13 PROCEDURE — 74220 FL ESOPHAGRAM WITH BARIUM TABLET: ICD-10-PCS | Mod: 26,,, | Performed by: INTERNAL MEDICINE

## 2023-09-13 PROCEDURE — 74220 X-RAY XM ESOPHAGUS 1CNTRST: CPT | Mod: TC

## 2023-09-13 PROCEDURE — 71046 X-RAY EXAM CHEST 2 VIEWS: CPT | Mod: TC,FY

## 2023-09-13 PROCEDURE — A9698 NON-RAD CONTRAST MATERIALNOC: HCPCS | Performed by: INTERNAL MEDICINE

## 2023-09-13 RX ADMIN — BARIUM SULFATE 200 ML: 0.81 POWDER, FOR SUSPENSION ORAL at 09:09

## 2023-09-13 RX ADMIN — BARIUM SULFATE 150 ML: 0.6 SUSPENSION ORAL at 09:09

## 2023-09-17 NOTE — PROGRESS NOTES
Antione your esophagram was read as follows just some reflux a hiatal hernia nonspecific dysmotility no stricture or narrowing seen    Impression:     Gastroesophageal reflux.     Small sliding-type hiatal hernia.     Esophageal dysmotility, as detailed above.     Normal timed esophagram.     Electronically signed by resident: Tonia Valdovinos  Date:                                            09/13/2023  Time:                                           09:43     Electronically signed by: Josh Lewis  Date:                                            09/13/2023

## 2023-09-20 ENCOUNTER — TELEPHONE (OUTPATIENT)
Dept: ENDOSCOPY | Facility: HOSPITAL | Age: 73
End: 2023-09-20
Payer: MEDICARE

## 2023-09-20 VITALS — HEIGHT: 71 IN | BODY MASS INDEX: 29.4 KG/M2 | WEIGHT: 210 LBS

## 2023-09-20 DIAGNOSIS — R13.10 DYSPHAGIA, UNSPECIFIED TYPE: Primary | ICD-10-CM

## 2023-09-20 NOTE — TELEPHONE ENCOUNTER
"----- Message from Eliazar Purdy MD sent at 2023  5:42 PM CDT -----  Procedure:  Please do esophageal motility for evaluation of dysphagia intermittent solid food dysphagia proximal esophageal region    Diagnosis: Dysphagia    Procedure Timin-6 weeks    #If within 4 weeks selected, please lokesh as high priority#    #If greater than 12 weeks, please select "5-12 weeks" and delay sending until 2 months prior to requested date#    Provider: Any GI provider    Location: [address]    Additional Scheduling Information: No scheduling concerns    Prep Specifications:Standard prep    Is the patient taking a GLP-1 Agonist:no    Have you attached a patient to this message: yes   "

## 2023-09-20 NOTE — TELEPHONE ENCOUNTER
Contacted patient regarding Esophageal Manometry procedure(s) requested by referring provider, Dr. Purdy. Patient did not answer. Left message for the patient to return call to my direct number at [phone].

## 2023-09-20 NOTE — TELEPHONE ENCOUNTER
Spoke to patient to schedule procedure(s) Esophageal Manometry       Physician to perform procedure(s) Dr. SHILOH Drew  Date of Procedure (s) 9/27/23  Arrival Time 9:00 AM  Time of Procedure(s) 10:00 AM   Location of Procedure(s) [facility]  Type of Rx Prep sent to patient: N/A  Instructions provided to patient via [facility]    Patient was informed on the following information and verbalized understanding. Screening questionnaire reviewed with patient and complete. If procedure requires anesthesia, a responsible adult needs to be present to accompany the patient home, patient cannot drive after receiving anesthesia. Appointment details are tentative, especially check-in time. Patient will receive a prep-op call 4 days prior to confirm check-in time for procedure. If applicable the patient should contact their pharmacy to verify Rx for procedure prep is ready for pick-up. Patient was advised to call the scheduling department at [phone] if pharmacy states no Rx is available. Patient was advised to call the endoscopy scheduling department if any questions or concerns arise.      [facility] Scheduling Department

## 2023-09-20 NOTE — TELEPHONE ENCOUNTER
"----- Message from Eliazar Purdy MD sent at 2023  5:42 PM CDT -----  Procedure:  Please do esophageal motility for evaluation of dysphagia intermittent solid food dysphagia proximal esophageal region     Diagnosis: Dysphagia     Procedure Timin-6 weeks     #If within 4 weeks selected, please lokesh as high priority#     #If greater than 12 weeks, please select "5-12 weeks" and delay sending until 2 months prior to requested date#     Provider: Any GI provider     Location: [address]     Additional Scheduling Information: No scheduling concerns     Prep Specifications:Standard prep     Is the patient taking a GLP-1 Agonist:no     Have you attached a patient to this message: yes      "

## 2023-09-21 ENCOUNTER — OFFICE VISIT (OUTPATIENT)
Dept: NEUROLOGY | Facility: CLINIC | Age: 73
End: 2023-09-21
Payer: MEDICARE

## 2023-09-21 VITALS
SYSTOLIC BLOOD PRESSURE: 129 MMHG | BODY MASS INDEX: 28.83 KG/M2 | WEIGHT: 205.94 LBS | HEART RATE: 73 BPM | DIASTOLIC BLOOD PRESSURE: 68 MMHG | HEIGHT: 71 IN

## 2023-09-21 DIAGNOSIS — I10 HYPERTENSION, UNSPECIFIED TYPE: ICD-10-CM

## 2023-09-21 DIAGNOSIS — G61.81 CIDP (CHRONIC INFLAMMATORY DEMYELINATING POLYNEUROPATHY): ICD-10-CM

## 2023-09-21 DIAGNOSIS — M21.372 LEFT FOOT DROP: ICD-10-CM

## 2023-09-21 DIAGNOSIS — M21.371 BILATERAL FOOT-DROP: Primary | ICD-10-CM

## 2023-09-21 DIAGNOSIS — M21.372 BILATERAL FOOT-DROP: Primary | ICD-10-CM

## 2023-09-21 PROCEDURE — 99215 OFFICE O/P EST HI 40 MIN: CPT | Mod: S$PBB,,, | Performed by: PSYCHIATRY & NEUROLOGY

## 2023-09-21 PROCEDURE — 99999 PR PBB SHADOW E&M-EST. PATIENT-LVL III: ICD-10-PCS | Mod: PBBFAC,,, | Performed by: PSYCHIATRY & NEUROLOGY

## 2023-09-21 PROCEDURE — 99213 OFFICE O/P EST LOW 20 MIN: CPT | Mod: PBBFAC | Performed by: PSYCHIATRY & NEUROLOGY

## 2023-09-21 PROCEDURE — 99215 PR OFFICE/OUTPT VISIT, EST, LEVL V, 40-54 MIN: ICD-10-PCS | Mod: S$PBB,,, | Performed by: PSYCHIATRY & NEUROLOGY

## 2023-09-21 PROCEDURE — 99999 PR PBB SHADOW E&M-EST. PATIENT-LVL III: CPT | Mod: PBBFAC,,, | Performed by: PSYCHIATRY & NEUROLOGY

## 2023-09-21 RX ORDER — AZATHIOPRINE 50 MG/1
TABLET ORAL
Qty: 90 TABLET | Refills: 11 | Status: SHIPPED | OUTPATIENT
Start: 2023-09-21 | End: 2024-09-20

## 2023-09-21 NOTE — PROGRESS NOTES
[facility] - NEUROLOGY [address]TH [address] [facility], [place]    Date: 9/21/23  Patient Name: Antione Torres   MRN: [id number]   PCP: JAMEL Farrar (Inactive)      Chief Complaint: Foot Drop and Muscle weakness  Subjective:     Interval History 09/21/2023    I have reviewed all relevant history in Epic. The patient presents for routine follow up regarding CIDP and therapy management. He is currently managed with cymbalta 90mg daily and Imuran 50mg BID (unclear if taking). He was given genetic testing kit via [employer]; this was WNL and did not reveal any abnormalities. He notes that he has no pain but rather just weakness. He notes that he has difficulty with swallowing and some GERD he has done an EGD for workup and evaluation. He denies any weakness in his arms and denies any progression of his symptoms in the bilateral legs. He had maybe one fall since last visit.     Blood work drawn-   6.9 WBC   200 platelet      Creatinine- 0.7   BUN- 15    AST- 23  ALT- 24    Interval History 1/30/2023    I have reviewed all relevant history in Epic. The patient presents for routine follow up regarding CIDP and therapy plan. He has an aunt who has drop foot and a cousin who also has drop foot. He presents with his wife who is a nurse manager. He has met with someone who will help to get him an AFO on his right foot. He notes that IVIg did not give him any benefit in the past. He notes that he has had a steady decline in his ability to walk since his onset of symptoms. He has a  who comes to his house and physical therapy. He does two exercise therapies per week. 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     Patient brought lab work from outside provider   Cr- 0.61 on 11/3/22 all other labs WNL       HPI:   Mr. Antione Torres is a 73 y.o. male with HTN, prostate cancer, GERD, and L foot drop presenting for evaluation regarding bilateral  foot drop. On chart review the patient was previously seen by Dr. Nuñez. His current therapy regimen is Imuran 50mg BID. He previously tried prednisone and did not have beneficial results. His initial work up was with [facility]. He denies any problems with swallowing or chewing. He denies any cola colored urine. Does endorse some weakness of the hands but this is attributed to his arthritis denies pain other places on the upper extremities; denies any other upper extremity weakness. Patient uses an AFO which significantly improves his ability to walk he also walks with a walker. He notes that he has weakness from the lower back down.     He notes that he had swelling of the bilateral feet which discontinued with use of the Imuran     Initial presentation- 7-8 years ago with a subtle foot drop of the L foot. He notes he would trip frequently. Per the patient and his wife he noticed more of his symptoms during lockdown because patient and his wife were walking frequently and he was having difficulty with walking.     Notes that he had frequent burning/tingling of the bilateral hands and feet in his 40s he notes that it would happen off and on for several years but it no longer occurs. He denies any current numbness and tingling he finds that both feet have some numbness/tingling.     Prostate carcinoma- Sx 20 years ago, Radiation 11 years ago      Note from Dr. Nuñez on 06/30/2022-   Hx of IVIg infusions   LP showed borderline? Unclear what protein level was   Sural Nerve Bx- WNL   EMG- peripheral neuropathy     Of note no EMG records in [facility].     PAST MEDICAL HISTORY:  Past Medical History:   Diagnosis Date    Hyperlipidemia     Hypertension        PAST SURGICAL HISTORY:  Past Surgical History:   Procedure Laterality Date    CHOLECYSTECTOMY      COLONOSCOPY      COLONOSCOPY N/A 7/25/2016    Procedure: COLONOSCOPY;  Surgeon: Stanley Woo MD;  [facility] ([address] [place] [address]);  Service: Endoscopy;   Laterality: N/A;    ESOPHAGOGASTRODUODENOSCOPY N/A 8/21/2023    Procedure: EGD (ESOPHAGOGASTRODUODENOSCOPY);  Surgeon: Nasir Mann MD;  Location: [facility] ([address] [place] [address]);  Service: Endoscopy;  Laterality: N/A;  Procedure Timing: < 2 week  Provider: Any endoscopist   Location: No Preference   Additional Scheduling Information: No scheduling concerns  8/16 ref. by Dr. Purdy, instr. to [facility]    HERNIA REPAIR      NECK SURGERY      PROSTATE SURGERY         CURRENT MEDS:  Current Outpatient Medications   Medication Sig Dispense Refill    acetaminophen (TYLENOL) 650 MG TbSR 2 tablets as needed      atorvastatin (LIPITOR) 80 MG tablet 80 mg.      azaTHIOprine (IMURAN) 50 mg Tab Take 2 tablets (100 mg total) by mouth every morning AND 1 tablet (50 mg total) every evening. 90 tablet 11    cannabidioL (EPIDIOLEX) 100 mg/mL as directed      cholecalciferol, vitamin D3, (VITAMIN D3) 50 mcg (2,000 unit) Tab TAKE 2000UNIT BY MOUTH ONCE DAILY FOR VITAMIN D.  TAKE WITH FOOD.      DULoxetine (CYMBALTA) 30 MG capsule Take 3 capsules by mouth once daily.      ezetimibe (ZETIA) 10 mg tablet Take 10 mg by mouth once daily.      losartan (COZAAR) 50 MG tablet Take 50 mg by mouth once daily.      multivitamin (THERAGRAN) per tablet Take 1 tablet by mouth once daily.      oxybutynin (DITROPAN) 5 MG Tab Take 1 tablet (5 mg total) by mouth 3 (three) times daily. 30 tablet 11    paroxetine (PAXIL) 30 MG tablet Take 30 mg by mouth every morning.      prazosin (MINIPRESS) 5 MG capsule Take 1 capsule by mouth every evening.      RABEprazole (ACIPHEX) 20 mg tablet Take 1 tablet (20 mg total) by mouth before breakfast. 90 tablet 1    zinc sulfate (ZINC-15) 66 mg Tab 2 tablets       No current facility-administered medications for this visit.       ALLERGIES:  Review of patient's allergies indicates:   Allergen Reactions    Pcn [penicillins] Hives, Swelling and Rash       FAMILY HISTORY:  Family History   Problem Relation Age of Onset     Pancreatic cancer Maternal Uncle 75    Celiac disease Neg Hx     Cirrhosis Neg Hx     Colon cancer Neg Hx     Colon polyps Neg Hx     Esophageal cancer Neg Hx     Inflammatory bowel disease Neg Hx     Liver cancer Neg Hx     Rectal cancer Neg Hx     Stomach cancer Neg Hx     Ulcerative colitis Neg Hx     Pancreatitis Neg Hx     Kidney cancer Neg Hx     Dupont's esophagus Neg Hx     Bladder Cancer Neg Hx     Uterine cancer Neg Hx     Ovarian cancer Neg Hx     Crohn's disease Neg Hx     Irritable bowel syndrome Neg Hx     Liver disease Neg Hx     Hemochromatosis Neg Hx        SOCIAL HISTORY:  Social History     Tobacco Use    Smoking status: Former     Current packs/day: 0.00     Types: Cigarettes     Quit date: 1999     Years since quittin.8    Smokeless tobacco: Never    Tobacco comments:     cigarettes   Substance Use Topics    Alcohol use: Not Currently     Alcohol/week: 1.0 standard drink of alcohol     Types: 1 Shots of liquor per week     Comment: moderate    Drug use: No       Review of Systems:  Review of Systems   Constitutional:  Negative for chills, fever, malaise/fatigue and weight loss.   HENT:  Negative for ear discharge, ear pain, hearing loss, sinus pain, sore throat and tinnitus.    Eyes:  Negative for blurred vision, double vision and photophobia.   Respiratory:  Negative for cough, shortness of breath and wheezing.    Cardiovascular:  Negative for chest pain, palpitations, orthopnea and leg swelling (States previously had this but it improved on the Imuran).   Gastrointestinal:  Positive for diarrhea and nausea. Negative for abdominal pain, constipation and vomiting.   Genitourinary:  Negative for dysuria, frequency and urgency.   Musculoskeletal:  Positive for back pain (2/2 Arthritis) and falls (regular falls with loss of balance he notes that he does not attempt to catch himself). Negative for myalgias and neck pain.   Neurological:  Positive for weakness. Negative for tingling,  "seizures, loss of consciousness and headaches.            Objective:     Vitals:    09/21/23 1351   BP: 129/68   Pulse: 73   Weight: 93.4 kg (205 lb 14.6 oz)   Height: 5' 11" (1.803 m)             Lab Results   Component Value Date    WBC 5.90 08/17/2023    HGB 13.7 (L) 08/17/2023    HCT 39.9 (L) 08/17/2023     08/17/2023    CHOL 176 08/27/2018    TRIG 125 08/27/2018    HDL 44 08/27/2018    ALT 37 08/17/2023    AST 33 08/17/2023     08/17/2023    K 3.9 08/17/2023     08/17/2023    CREATININE 0.61 08/17/2023    BUN 9 08/17/2023    CO2 26 08/17/2023    TSH 3.000 08/17/2023    [id number] 1041 (H) 08/17/2023       NEUROLOGICAL EXAMINATION:     MENTAL STATUS   Oriented to person, place, and time.   Level of consciousness: alert    MOTOR EXAM   Muscle bulk: normal  Overall muscle tone: normal    Strength   Strength 5/5 except as noted.   Right iliopsoas: 3/5  Left iliopsoas: 3/5       L Iliopsoas 3-/5  R Iliopsoas 3+/5    L Knee Extension 4/5   L Knee Flexion 4-/5    R Knee Extension 4/5  R Knee Flexion 4-/5    R Dorsiflexion 2/5  L Dorsiflexion 0/5    R Plantar Flexion 3/5  L Plantar Flexion 4-/5    R Internal Foot Rotation 3/5  L Internal Foot Rotation 3-/5    R External Foot Rotation 3/5  L External Foot Rotation 0/5     REFLEXES     Reflexes   Right brachioradialis: 2+  Left brachioradialis: 2+  Right biceps: 2+  Left biceps: 2+  Right triceps: 2+  Left triceps: 2+  Right patellar: 1+  Left patellar: 1+  Right achilles: 0  Left achilles: 0  Right plantar: equivocal  Left plantar: equivocal  Right Chapman: absent  Left Chapman: absent  Right ankle clonus: absent  Left ankle clonus: absent    SENSORY EXAM   Right leg vibration: decreased from toes  Left leg vibration: decreased from ankle  Right arm pinprick: decreased from elbow  Left arm pinprick: decreased from elbow  Right leg pinprick: absent above the knee bilaterally.  Left leg pinprick: absent above the knee bilaterally.    GAIT AND " COORDINATION     Tremor   Resting tremor: absent  Intention tremor: absent  Action tremor: absent       Using rolling walker for any ambulation     Uses AFO on L foot    Bilateral foot drop noted     ? ?        Assessment:   Antione Torres is a 73 y.o. male presenting for evaluation regarding bilateral foot drop and muscle weakness.     Plan:   Due to negative genetic panel believe it would be beneficial to do repeat EMG for further workup and evaluation of the patients changes to see if there is any additional evidence of possible CIDP or another motor neuropathy.       Increase Imuran to 100mg QAM and 50mg QHS increase to monthly blood work moniotring of LFT and WBC, Platelets    Problem List Items Addressed This Visit          Neuro    CIDP (chronic inflammatory demyelinating polyneuropathy)    Relevant Medications    azaTHIOprine (IMURAN) 50 mg Tab    Left foot drop    Bilateral foot-drop - Primary    Relevant Orders    HME - OTHER    EMG W/ ULTRASOUND AND NERVE CONDUCTION TEST 2 Extremities       Cardiac/Vascular    Hypertension    Current Assessment & Plan     Discussed with patient importance of management of hypertension due to secondary stroke risk. Patient is on appropriate therapy currently managed by PCP.                     I spent a total of 48 minutes on the day of the visit. This includes face to face time and non-face to face time preparing to see the patient (eg, review of tests), obtaining and/or reviewing separately obtained history, documenting clinical information in the electronic or other health record, independently interpreting results and communicating results to the patient/family/caregiver, or care coordinator.    Rick Posey MD  [facility]

## 2023-09-25 ENCOUNTER — TELEPHONE (OUTPATIENT)
Dept: NEUROLOGY | Facility: CLINIC | Age: 73
End: 2023-09-25
Payer: MEDICARE

## 2023-09-25 NOTE — TELEPHONE ENCOUNTER
Spoke to patient and scheduled the EMG for Friday October 13th for 10am. Patient confirmed this date and time with his wife while on the line with me. Patient aware of date and time and all pertinent information. .

## 2023-09-25 NOTE — TELEPHONE ENCOUNTER
----- Message from Lupe Taylor PA-C sent at 9/21/2023  2:25 PM CDT -----  Hey could you please assist with the EMG order for this patient?       Thank you so much,   Lupe

## 2023-09-26 ENCOUNTER — PATIENT MESSAGE (OUTPATIENT)
Dept: ENDOSCOPY | Facility: HOSPITAL | Age: 73
End: 2023-09-26
Payer: MEDICARE

## 2023-09-26 ENCOUNTER — TELEPHONE (OUTPATIENT)
Dept: ENDOSCOPY | Facility: HOSPITAL | Age: 73
End: 2023-09-26
Payer: MEDICARE

## 2023-09-26 DIAGNOSIS — Z12.11 SCREEN FOR COLON CANCER: Primary | ICD-10-CM

## 2023-09-26 DIAGNOSIS — R13.10 DYSPHAGIA, UNSPECIFIED TYPE: ICD-10-CM

## 2023-09-26 DIAGNOSIS — D64.9 ANEMIA, UNSPECIFIED TYPE: ICD-10-CM

## 2023-09-26 NOTE — TELEPHONE ENCOUNTER
Spoke to pt to schedule procedure(s) Colonoscopy/EGD       Physician to perform procedure(s) Dr. SHILOH Drew  Date of Procedure (s) 12/15/23  Arrival Time 1:00 PM  Time of Procedure(s) 2:00 PM   Location of Procedure(s) [facility]  Type of Rx Prep sent to patient: PEG  Instructions provided to patient via [facility]    Patient was informed on the following information and verbalized understanding. Screening questionnaire reviewed with patient and complete. If procedure requires anesthesia, a responsible adult needs to be present to accompany the patient home, patient cannot drive after receiving anesthesia. Appointment details are tentative, especially check-in time. Patient will receive a prep-op call 4 days prior to confirm check-in time for procedure. If applicable the patient should contact their pharmacy to verify Rx for procedure prep is ready for pick-up. Patient was advised to call the scheduling department at [phone] if pharmacy states no Rx is available. Patient was advised to call the endoscopy scheduling department if any questions or concerns arise.      [facility] Scheduling Department

## 2023-09-27 ENCOUNTER — PATIENT MESSAGE (OUTPATIENT)
Dept: GASTROENTEROLOGY | Facility: CLINIC | Age: 73
End: 2023-09-27

## 2023-09-27 ENCOUNTER — HOSPITAL ENCOUNTER (OUTPATIENT)
Facility: HOSPITAL | Age: 73
Discharge: HOME | End: 2023-09-27
Attending: STUDENT IN AN ORGANIZED HEALTH CARE EDUCATION/TRAINING PROGRAM | Admitting: INTERNAL MEDICINE
Payer: MEDICARE

## 2023-09-27 VITALS
HEIGHT: 71 IN | TEMPERATURE: 98 F | RESPIRATION RATE: 16 BRPM | WEIGHT: 205 LBS | SYSTOLIC BLOOD PRESSURE: 151 MMHG | BODY MASS INDEX: 28.7 KG/M2 | DIASTOLIC BLOOD PRESSURE: 70 MMHG | OXYGEN SATURATION: 97 % | HEART RATE: 98 BPM

## 2023-09-27 DIAGNOSIS — R13.10 DYSPHAGIA: ICD-10-CM

## 2023-09-27 PROCEDURE — 91010 ESOPHAGUS MOTILITY STUDY: CPT | Mod: TC | Performed by: INTERNAL MEDICINE

## 2023-09-27 PROCEDURE — 91037 ESOPH IMPED FUNCTION TEST: CPT | Mod: 26,,, | Performed by: INTERNAL MEDICINE

## 2023-09-27 PROCEDURE — 91037 PR GERD TST W/ NASAL IMPEDENCE ELECTROD: ICD-10-PCS | Mod: 26,,, | Performed by: INTERNAL MEDICINE

## 2023-09-27 PROCEDURE — 25000003 PHARM REV CODE 250: Performed by: INTERNAL MEDICINE

## 2023-09-27 PROCEDURE — 91010 PR ESOPHAGEAL MOTILITY STUDY, MA2METRY: ICD-10-PCS | Mod: 26,GC,, | Performed by: INTERNAL MEDICINE

## 2023-09-27 PROCEDURE — 91037 ESOPH IMPED FUNCTION TEST: CPT | Mod: TC | Performed by: INTERNAL MEDICINE

## 2023-09-27 PROCEDURE — 91010 ESOPHAGUS MOTILITY STUDY: CPT | Mod: 26,GC,, | Performed by: INTERNAL MEDICINE

## 2023-09-27 RX ORDER — LIDOCAINE HYDROCHLORIDE 20 MG/ML
JELLY TOPICAL ONCE
Status: COMPLETED | OUTPATIENT
Start: 2023-09-27 | End: 2023-09-27

## 2023-09-27 RX ADMIN — LIDOCAINE HYDROCHLORIDE 10 ML: 20 JELLY TOPICAL at 09:09

## 2023-09-27 NOTE — OR NURSING
Esophageal manometry completed. Tolerated well. AVS and discharge instructions reviewed and understood. No pain at time of discharge. Typical nasal and throat irritation post catheter placement and removal noted.

## 2023-09-30 NOTE — PROGRESS NOTES
Elvia your chest x-ray was read as follows    FINDINGS:  Heart size normal.  The lungs are clear.  No pleural effusion.  Postsurgical changes noted in the cervical spine as before     Impression:     FINDINGS:  Heart size normal.  The lungs are clear.  No pleural effusion.  Postsurgical changes noted in the cervical spine as before        Electronically signed by: Dimas Nickerson MD  Date:                                            09/13/2023

## 2023-09-30 NOTE — PROVATION PATIENT INSTRUCTIONS
Discharge Summary/Instructions after an Endoscopic Procedure  Patient Name: Antione Torres  Patient MRN: [id number]  Patient YOB: 1950  Thursday, September 28, 2023  Chuy Carreno MD  Dear patient,  As a result of recent federal legislation (The Federal Cures Act), you may   receive lab or pathology results from your procedure in your [facility]   account before your physician is able to contact you. Your physician or   their representative will relay the results to you with their   recommendations at their soonest availability.  Thank you,  RESTRICTIONS:  During your procedure today, you received medications for sedation.  These   medications may affect your judgment, balance and coordination.  Therefore,   for 24 hours, you have the following restrictions:   - DO NOT drive a car, operate machinery, make legal/financial decisions,   sign important papers or drink alcohol.    ACTIVITY:  Today: no heavy lifting, straining or running due to procedural   sedation/anesthesia.  The following day: return to full activity including work.  DIET:  Eat and drink normally unless instructed otherwise.     TREATMENT FOR COMMON SIDE EFFECTS:  - Mild abdominal pain, nausea, belching, bloating or excessive gas:  rest,   eat lightly and use a heating pad.  - Sore Throat: treat with throat lozenges and/or gargle with warm salt   water.  - Because air was used during the procedure, expelling large amounts of air   from your rectum or belching is normal.  - If a bowel prep was taken, you may not have a bowel movement for 1-3 days.    This is normal.  SYMPTOMS TO WATCH FOR AND REPORT TO YOUR PHYSICIAN:  1. Abdominal pain or bloating, other than gas cramps.  2. Chest pain.  3. Back pain.  4. Signs of infection such as: chills or fever occurring within 24 hours   after the procedure.  5. Rectal bleeding, which would show as bright red, maroon, or black stools.   (A tablespoon of blood from the rectum is not serious, especially if    hemorrhoids are present.)  6. Vomiting.  7. Weakness or dizziness.  GO DIRECTLY TO THE NEAREST EMERGENCY ROOM IF YOU HAVE ANY OF THE FOLLOWING:      Difficulty breathing              Chills and/or fever over 101 F   Persistent vomiting and/or vomiting blood   Severe abdominal pain   Severe chest pain   Black, tarry stools   Bleeding- more than one tablespoon   Any other symptom or condition that you feel may need urgent attention  Your doctor recommends these additional instructions:  If any biopsies were taken, your doctors clinic will contact you in 1 to 2   weeks with any results.  - Discharge patient to home.   - Return to referring physician as previously scheduled.  For questions, problems or results please call your physician - Chuy Carreno MD at Work:  [phone].  [facility], EMERGENCY ROOM PHONE NUMBER: [phone]  IF A COMPLICATION OR EMERGENCY SITUATION ARISES AND YOU ARE UNABLE TO REACH   YOUR PHYSICIAN - GO DIRECTLY TO THE EMERGENCY ROOM.  Chuy Carreno MD  9/30/2023 1:46:08 PM  This report has been verified and signed electronically.  Dear patient,  As a result of recent federal legislation (The Federal Cures Act), you may   receive lab or pathology results from your procedure in your [facility]   account before your physician is able to contact you. Your physician or   their representative will relay the results to you with their   recommendations at their soonest availability.  Thank you,  PROVATION

## 2023-10-12 DIAGNOSIS — J32.9 CHRONIC INFECTION OF SINUS: Primary | ICD-10-CM

## 2023-10-13 ENCOUNTER — PROCEDURE VISIT (OUTPATIENT)
Dept: NEUROLOGY | Facility: CLINIC | Age: 73
End: 2023-10-13
Payer: MEDICARE

## 2023-10-13 DIAGNOSIS — M21.371 BILATERAL FOOT-DROP: ICD-10-CM

## 2023-10-13 DIAGNOSIS — M21.372 BILATERAL FOOT-DROP: ICD-10-CM

## 2023-10-13 DIAGNOSIS — M54.16 LUMBAR RADICULOPATHY, CHRONIC: Primary | ICD-10-CM

## 2023-10-13 PROCEDURE — 95911 PR NERVE CONDUCTION STUDY; 9-10 STUDIES: ICD-10-PCS | Mod: 26,S$PBB,, | Performed by: PSYCHIATRY & NEUROLOGY

## 2023-10-13 PROCEDURE — 95886 MUSC TEST DONE W/N TEST COMP: CPT | Mod: PBBFAC | Performed by: PSYCHIATRY & NEUROLOGY

## 2023-10-13 PROCEDURE — 95886 PR EMG COMPLETE, W/ NERVE CONDUCTION STUDIES, 5+ MUSCLES: ICD-10-PCS | Mod: 26,S$PBB,, | Performed by: PSYCHIATRY & NEUROLOGY

## 2023-10-13 PROCEDURE — 95911 NRV CNDJ TEST 9-10 STUDIES: CPT | Mod: 26,S$PBB,, | Performed by: PSYCHIATRY & NEUROLOGY

## 2023-10-13 PROCEDURE — 95886 MUSC TEST DONE W/N TEST COMP: CPT | Mod: 26,S$PBB,, | Performed by: PSYCHIATRY & NEUROLOGY

## 2023-10-13 PROCEDURE — 95911 NRV CNDJ TEST 9-10 STUDIES: CPT | Mod: PBBFAC | Performed by: PSYCHIATRY & NEUROLOGY

## 2023-10-13 RX ORDER — ALPRAZOLAM 2 MG/1
2 TABLET ORAL
Qty: 2 TABLET | Refills: 0 | Status: SHIPPED | OUTPATIENT
Start: 2023-10-13 | End: 2023-10-14

## 2023-10-23 ENCOUNTER — HOSPITAL ENCOUNTER (OUTPATIENT)
Dept: RADIOLOGY | Facility: HOSPITAL | Age: 73
Discharge: HOME | End: 2023-10-23
Attending: PSYCHIATRY & NEUROLOGY
Payer: MEDICARE

## 2023-10-23 DIAGNOSIS — M54.16 LUMBAR RADICULOPATHY, CHRONIC: ICD-10-CM

## 2023-10-23 PROCEDURE — 72148 MRI LUMBAR SPINE WITHOUT CONTRAST: ICD-10-PCS | Mod: 26,,, | Performed by: RADIOLOGY

## 2023-10-23 PROCEDURE — 72148 MRI LUMBAR SPINE W/O DYE: CPT | Mod: 26,,, | Performed by: RADIOLOGY

## 2023-10-23 PROCEDURE — 72148 MRI LUMBAR SPINE W/O DYE: CPT | Mod: TC

## 2023-12-07 DIAGNOSIS — R13.10 DYSPHAGIA, UNSPECIFIED TYPE: ICD-10-CM

## 2023-12-07 DIAGNOSIS — K21.9 GASTROESOPHAGEAL REFLUX DISEASE, UNSPECIFIED WHETHER ESOPHAGITIS PRESENT: ICD-10-CM

## 2023-12-07 RX ORDER — RABEPRAZOLE SODIUM 20 MG/1
20 TABLET, DELAYED RELEASE ORAL
Qty: 90 TABLET | Refills: 1 | Status: SHIPPED | OUTPATIENT
Start: 2023-12-07 | End: 2024-06-04

## 2023-12-14 ENCOUNTER — ANESTHESIA EVENT (OUTPATIENT)
Dept: ENDOSCOPY | Facility: HOSPITAL | Age: 73
End: 2023-12-14
Payer: MEDICARE

## 2023-12-14 NOTE — ANESTHESIA PREPROCEDURE EVALUATION
12/14/2023  Antione Torres is a 73 y.o., male.       Patient Active Problem List   Diagnosis    Neck pain on right side    Special screening for malignant neoplasms, colon    Weakness    Unsteady gait    Back pain    Decreased strength of lower extremity    Poor balance    CIDP (chronic inflammatory demyelinating polyneuropathy)    Left foot drop    Carcinoma of prostate    Deep venous thrombosis    Dyslipidemia    Hypertension    Gastroesophageal reflux disease    Post-traumatic stress disorder, unspecified    Sleep disorder, unspecified    Urge incontinence of urine    Intestinal malabsorption    History of cholecystectomy    Bilateral foot-drop    Dysphagia       Past Medical History:   Diagnosis Date    Hyperlipidemia     Hypertension        ECHO: No results found for this or any previous visit.      There is no height or weight on file to calculate BMI.    Tobacco Use: Medium Risk (12/14/2023)    Patient History     Smoking Tobacco Use: Former     Smokeless Tobacco Use: Never     Passive Exposure: Not on file       Social History     Substance and Sexual Activity   Drug Use No        Alcohol Use: Not on file       Review of patient's allergies indicates:   Allergen Reactions    Pcn [penicillins] Hives, Swelling and Rash         Airway:  No value filed.    Pre-op Assessment    I have reviewed the Patient Summary Reports.    I have reviewed the NPO Status.   I have reviewed the Medications.     Review of Systems  Anesthesia Hx:             Denies Family Hx of Anesthesia complications.    Denies Personal Hx of Anesthesia complications.                    Hematology/Oncology:  Hematology Normal   Oncology Normal                                   EENT/Dental:  EENT/Dental Normal           Cardiovascular:     Hypertension                                        Pulmonary:  Pulmonary Normal                        Renal/:  Renal/ Normal                 Hepatic/GI:     GERD             Musculoskeletal:  Musculoskeletal Normal                Neurological:    Neuromuscular Disease,                                   Endocrine:  Endocrine Normal            Dermatological:  Skin Normal           Anesthesia Plan  Type of Anesthesia, risks & benefits discussed:    Anesthesia Type: Gen Natural Airway  Intra-op Monitoring Plan: Standard ASA Monitors  Post Op Pain Control Plan: multimodal analgesia  Induction:  IV  Informed Consent: Informed consent signed with the Patient and all parties understand the risks and agree with anesthesia plan.  All questions answered.   ASA Score: 2  Day of Surgery Review of History & Physical: H&P Update referred to the surgeon/provider.    Ready For Surgery From Anesthesia Perspective.     .

## 2023-12-14 NOTE — H&P
Short Stay Endoscopy History and Physical    PCP - JAMEL Farrar MD (Inactive)  Referring Physician - Eliazar Purdy MD  [address] [place] [address]    Procedure - EGD and Colonoscopy  ASA - per anesthesia  Mallampati - per anesthesia  History of Anesthesia problems - no  Family history Anesthesia problems -  no   Plan of anesthesia - General    HPI  73 y.o. male    Reason for procedure:   Screen for colon cancer [Z12.11]  Dysphagia, unspecified type [R13.10]  Anemia, unspecified type [D64.9]       ROS:  Constitutional: No fevers, chills, No weight loss  CV: No chest pain  Pulm: No cough, No shortness of breath  GI: see HPI    Medical History:  has a past medical history of Hyperlipidemia and Hypertension.    Surgical History:  has a past surgical history that includes Prostate surgery; Cholecystectomy; Hernia repair; Colonoscopy; Colonoscopy (N/A, 7/25/2016); Neck surgery; Esophagogastroduodenoscopy (N/A, 8/21/2023); and Esophageal manometry with measurement of impedance (N/A, 9/27/2023).    Family History: family history includes Pancreatic cancer (age of onset: 75) in his maternal uncle.    Social History:  reports that he quit smoking about 24 years ago. His smoking use included cigarettes. He has never used smokeless tobacco. He reports that he does not currently use alcohol after a past usage of about 1.0 standard drink of alcohol per week. He reports that he does not use drugs.    Review of patient's allergies indicates:   Allergen Reactions    Pcn [penicillins] Hives, Swelling and Rash       Medications:   Medications Prior to Admission   Medication Sig Dispense Refill Last Dose    acetaminophen (TYLENOL) 650 MG TbSR 2 tablets as needed       ALPRAZolam (XANAX) 2 MG Tab Take 1 tablet (2 mg total) by mouth On call Procedure for Anxiety. 2 tablet 0     atorvastatin (LIPITOR) 80 MG tablet 80 mg.       azaTHIOprine (IMURAN) 50 mg Tab Take 2 tablets (100 mg total) by mouth every morning AND 1  tablet (50 mg total) every evening. 90 tablet 11     cannabidioL (EPIDIOLEX) 100 mg/mL as directed       cholecalciferol, vitamin D3, (VITAMIN D3) 50 mcg (2,000 unit) Tab TAKE 2000UNIT BY MOUTH ONCE DAILY FOR VITAMIN D.  TAKE WITH FOOD.       DULoxetine (CYMBALTA) 30 MG capsule Take 3 capsules by mouth once daily.       ezetimibe (ZETIA) 10 mg tablet Take 10 mg by mouth once daily.       losartan (COZAAR) 50 MG tablet Take 50 mg by mouth once daily.       multivitamin (THERAGRAN) per tablet Take 1 tablet by mouth once daily.       oxybutynin (DITROPAN) 5 MG Tab Take 1 tablet (5 mg total) by mouth 3 (three) times daily. 30 tablet 11     paroxetine (PAXIL) 30 MG tablet Take 30 mg by mouth every morning.       prazosin (MINIPRESS) 5 MG capsule Take 1 capsule by mouth every evening.       RABEprazole (ACIPHEX) 20 mg tablet Take 1 tablet (20 mg total) by mouth before breakfast. 90 tablet 1     zinc sulfate (ZINC-15) 66 mg Tab 2 tablets          Physical Exam:    Vital Signs: There were no vitals filed for this visit.    General Appearance: Well appearing in no acute distress  Abdomen: Soft, non tender, non distended with normal bowel sounds, no masses    Labs:  Lab Results   Component Value Date    WBC 5.90 08/17/2023    HGB 13.7 (L) 08/17/2023    HCT 39.9 (L) 08/17/2023     08/17/2023    CHOL 176 08/27/2018    TRIG 125 08/27/2018    HDL 44 08/27/2018    ALT 37 08/17/2023    AST 33 08/17/2023     08/17/2023    K 3.9 08/17/2023     08/17/2023    CREATININE 0.61 08/17/2023    BUN 9 08/17/2023    CO2 26 08/17/2023    TSH 3.000 08/17/2023    PSA 0.01 04/30/2013    INR 1.0 01/06/2021       I have explained the risks and benefits of this endoscopic procedure to the patient including but not limited to bleeding, inflammation, infection, perforation, and death.    Assessment/Plan:     Dysphagia   GERD   CRC Surveillance       - Proceed with EGD and colonoscopy     Zara Drew MD  Gastroenterology   [facility]

## 2023-12-15 ENCOUNTER — ANESTHESIA (OUTPATIENT)
Dept: ENDOSCOPY | Facility: HOSPITAL | Age: 73
End: 2023-12-15
Payer: MEDICARE

## 2023-12-15 ENCOUNTER — HOSPITAL ENCOUNTER (OUTPATIENT)
Facility: HOSPITAL | Age: 73
Discharge: HOME | End: 2023-12-15
Attending: STUDENT IN AN ORGANIZED HEALTH CARE EDUCATION/TRAINING PROGRAM | Admitting: STUDENT IN AN ORGANIZED HEALTH CARE EDUCATION/TRAINING PROGRAM
Payer: MEDICARE

## 2023-12-15 VITALS
OXYGEN SATURATION: 97 % | HEIGHT: 71 IN | DIASTOLIC BLOOD PRESSURE: 67 MMHG | TEMPERATURE: 99 F | RESPIRATION RATE: 20 BRPM | SYSTOLIC BLOOD PRESSURE: 158 MMHG | WEIGHT: 205 LBS | HEART RATE: 67 BPM | BODY MASS INDEX: 28.7 KG/M2

## 2023-12-15 DIAGNOSIS — R13.10 DYSPHAGIA, UNSPECIFIED TYPE: Primary | ICD-10-CM

## 2023-12-15 DIAGNOSIS — R19.7 DIARRHEA: ICD-10-CM

## 2023-12-15 PROCEDURE — 45385 PR COLONOSCOPY,REMV LESN,SNARE: ICD-10-PCS | Mod: PT,,, | Performed by: STUDENT IN AN ORGANIZED HEALTH CARE EDUCATION/TRAINING PROGRAM

## 2023-12-15 PROCEDURE — 45385 COLONOSCOPY W/LESION REMOVAL: CPT | Mod: PT | Performed by: STUDENT IN AN ORGANIZED HEALTH CARE EDUCATION/TRAINING PROGRAM

## 2023-12-15 PROCEDURE — 88305 TISSUE EXAM BY PATHOLOGIST: CPT | Mod: 59 | Performed by: STUDENT IN AN ORGANIZED HEALTH CARE EDUCATION/TRAINING PROGRAM

## 2023-12-15 PROCEDURE — 37000008 HC ANESTHESIA 1ST 15 MINUTES: Performed by: STUDENT IN AN ORGANIZED HEALTH CARE EDUCATION/TRAINING PROGRAM

## 2023-12-15 PROCEDURE — 88342 IMHCHEM/IMCYTCHM 1ST ANTB: CPT | Mod: 26,,, | Performed by: STUDENT IN AN ORGANIZED HEALTH CARE EDUCATION/TRAINING PROGRAM

## 2023-12-15 PROCEDURE — 88342 IMHCHEM/IMCYTCHM 1ST ANTB: CPT | Performed by: STUDENT IN AN ORGANIZED HEALTH CARE EDUCATION/TRAINING PROGRAM

## 2023-12-15 PROCEDURE — 27201012 HC FORCEPS, HOT/COLD, DISP: Performed by: STUDENT IN AN ORGANIZED HEALTH CARE EDUCATION/TRAINING PROGRAM

## 2023-12-15 PROCEDURE — 25000003 PHARM REV CODE 250: Performed by: NURSE ANESTHETIST, CERTIFIED REGISTERED

## 2023-12-15 PROCEDURE — 88305 TISSUE EXAM BY PATHOLOGIST: CPT | Mod: 26,,, | Performed by: STUDENT IN AN ORGANIZED HEALTH CARE EDUCATION/TRAINING PROGRAM

## 2023-12-15 PROCEDURE — 43239 PR EGD, FLEX, W/BIOPSY, SGL/MULTI: ICD-10-PCS | Mod: 51,,, | Performed by: STUDENT IN AN ORGANIZED HEALTH CARE EDUCATION/TRAINING PROGRAM

## 2023-12-15 PROCEDURE — 43239 EGD BIOPSY SINGLE/MULTIPLE: CPT | Mod: 51,,, | Performed by: STUDENT IN AN ORGANIZED HEALTH CARE EDUCATION/TRAINING PROGRAM

## 2023-12-15 PROCEDURE — 88305 TISSUE EXAM BY PATHOLOGIST: ICD-10-PCS | Mod: 26,,, | Performed by: STUDENT IN AN ORGANIZED HEALTH CARE EDUCATION/TRAINING PROGRAM

## 2023-12-15 PROCEDURE — 45385 COLONOSCOPY W/LESION REMOVAL: CPT | Mod: PT,,, | Performed by: STUDENT IN AN ORGANIZED HEALTH CARE EDUCATION/TRAINING PROGRAM

## 2023-12-15 PROCEDURE — 37000009 HC ANESTHESIA EA ADD 15 MINS: Performed by: STUDENT IN AN ORGANIZED HEALTH CARE EDUCATION/TRAINING PROGRAM

## 2023-12-15 PROCEDURE — D9220A PRA ANESTHESIA: Mod: ,,, | Performed by: NURSE ANESTHETIST, CERTIFIED REGISTERED

## 2023-12-15 PROCEDURE — D9220A PRA ANESTHESIA: ICD-10-PCS | Mod: ,,, | Performed by: NURSE ANESTHETIST, CERTIFIED REGISTERED

## 2023-12-15 PROCEDURE — 99900035 HC TECH TIME PER 15 MIN (STAT)

## 2023-12-15 PROCEDURE — 94761 N-INVAS EAR/PLS OXIMETRY MLT: CPT

## 2023-12-15 PROCEDURE — 43239 EGD BIOPSY SINGLE/MULTIPLE: CPT | Performed by: STUDENT IN AN ORGANIZED HEALTH CARE EDUCATION/TRAINING PROGRAM

## 2023-12-15 PROCEDURE — 88342 CHG IMMUNOCYTOCHEMISTRY: ICD-10-PCS | Mod: 26,,, | Performed by: STUDENT IN AN ORGANIZED HEALTH CARE EDUCATION/TRAINING PROGRAM

## 2023-12-15 RX ORDER — LIDOCAINE HYDROCHLORIDE 20 MG/ML
INJECTION INTRAVENOUS
Status: DISCONTINUED | OUTPATIENT
Start: 2023-12-15 | End: 2023-12-15

## 2023-12-15 RX ORDER — PROPOFOL 10 MG/ML
VIAL (ML) INTRAVENOUS
Status: DISCONTINUED | OUTPATIENT
Start: 2023-12-15 | End: 2023-12-15

## 2023-12-15 RX ORDER — SODIUM CHLORIDE 9 MG/ML
INJECTION, SOLUTION INTRAVENOUS CONTINUOUS
Status: DISCONTINUED | OUTPATIENT
Start: 2023-12-15 | End: 2023-12-15 | Stop reason: HOSPADM

## 2023-12-15 RX ADMIN — LIDOCAINE HYDROCHLORIDE 100 MG: 20 INJECTION INTRAVENOUS at 01:12

## 2023-12-15 RX ADMIN — Medication 50 MG: at 02:12

## 2023-12-15 RX ADMIN — Medication 50 MG: at 01:12

## 2023-12-15 RX ADMIN — Medication 150 MG: at 01:12

## 2023-12-15 NOTE — ANESTHESIA POSTPROCEDURE EVALUATION
Anesthesia Post Evaluation    Patient: Antione Torres    Procedure(s) Performed: Procedure(s) (LRB):  EGD (ESOPHAGOGASTRODUODENOSCOPY) (N/A)  COLONOSCOPY (N/A)    Final Anesthesia Type: general      Patient location during evaluation: GI PACU  Patient participation: Yes- Able to Participate  Level of consciousness: awake and alert  Post-procedure vital signs: reviewed and stable  Pain management: adequate  Airway patency: patent    PONV status at discharge: No PONV  Anesthetic complications: no      Cardiovascular status: blood pressure returned to baseline  Respiratory status: unassisted  Hydration status: euvolemic  Follow-up not needed.              Vitals Value Taken Time   /67 12/15/23 1433   Temp 37.1 °C (98.8 °F) 12/15/23 1414   Pulse 64 12/15/23 1438   Resp 40 12/15/23 1438   SpO2 98 % 12/15/23 1438   Vitals shown include unvalidated device data.      Event Time   Out of Recovery 14:27:00         Pain/Kayleigh Score: Kayleigh Score: 9 (12/15/2023  2:20 PM)

## 2023-12-15 NOTE — PROVATION PATIENT INSTRUCTIONS
Discharge Summary/Instructions after an Endoscopic Procedure  Patient Name: Antione Torres  Patient MRN: [id number]  Patient YOB: 1950  Friday, December 15, 2023  Zara Drew MD  Dear patient,  As a result of recent federal legislation (The Federal Cures Act), you may   receive lab or pathology results from your procedure in your [facility]   account before your physician is able to contact you. Your physician or   their representative will relay the results to you with their   recommendations at their soonest availability.  Thank you,  RESTRICTIONS:  During your procedure today, you received medications for sedation.  These   medications may affect your judgment, balance and coordination.  Therefore,   for 24 hours, you have the following restrictions:   - DO NOT drive a car, operate machinery, make legal/financial decisions,   sign important papers or drink alcohol.    ACTIVITY:  Today: no heavy lifting, straining or running due to procedural   sedation/anesthesia.  The following day: return to full activity including work.  DIET:  Eat and drink normally unless instructed otherwise.     TREATMENT FOR COMMON SIDE EFFECTS:  - Mild abdominal pain, nausea, belching, bloating or excessive gas:  rest,   eat lightly and use a heating pad.  - Sore Throat: treat with throat lozenges and/or gargle with warm salt   water.  - Because air was used during the procedure, expelling large amounts of air   from your rectum or belching is normal.  - If a bowel prep was taken, you may not have a bowel movement for 1-3 days.    This is normal.  SYMPTOMS TO WATCH FOR AND REPORT TO YOUR PHYSICIAN:  1. Abdominal pain or bloating, other than gas cramps.  2. Chest pain.  3. Back pain.  4. Signs of infection such as: chills or fever occurring within 24 hours   after the procedure.  5. Rectal bleeding, which would show as bright red, maroon, or black stools.   (A tablespoon of blood from the rectum is not serious, especially if    hemorrhoids are present.)  6. Vomiting.  7. Weakness or dizziness.  GO DIRECTLY TO THE NEAREST EMERGENCY ROOM IF YOU HAVE ANY OF THE FOLLOWING:      Difficulty breathing              Chills and/or fever over 101 F   Persistent vomiting and/or vomiting blood   Severe abdominal pain   Severe chest pain   Black, tarry stools   Bleeding- more than one tablespoon   Any other symptom or condition that you feel may need urgent attention  Your doctor recommends these additional instructions:  If any biopsies were taken, your doctors clinic will contact you in 1 to 2   weeks with any results.  - Discharge patient to home (ambulatory).   - Resume regular diet.   - Continue present medications.   - Await pathology results.   - Recommend CPAP compliance  For questions, problems or results please call your physician - Zara Drew MD at Work:  [phone].  [facility] [place], EMERGENCY ROOM PHONE NUMBER: [phone]  IF A COMPLICATION OR EMERGENCY SITUATION ARISES AND YOU ARE UNABLE TO REACH   YOUR PHYSICIAN - GO DIRECTLY TO THE EMERGENCY ROOM.  Zara Drew MD  12/15/2023 2:12:43 PM  This report has been verified and signed electronically.  Dear patient,  As a result of recent federal legislation (The Federal Cures Act), you may   receive lab or pathology results from your procedure in your [facility]   account before your physician is able to contact you. Your physician or   their representative will relay the results to you with their   recommendations at their soonest availability.  Thank you,  PROVATION

## 2023-12-15 NOTE — PROVATION PATIENT INSTRUCTIONS
Discharge Summary/Instructions after an Endoscopic Procedure  Patient Name: Antione Torres  Patient MRN: [id number]  Patient YOB: 1950  Friday, December 15, 2023  Zara Drew MD  Dear patient,  As a result of recent federal legislation (The Federal Cures Act), you may   receive lab or pathology results from your procedure in your [facility]   account before your physician is able to contact you. Your physician or   their representative will relay the results to you with their   recommendations at their soonest availability.  Thank you,  RESTRICTIONS:  During your procedure today, you received medications for sedation.  These   medications may affect your judgment, balance and coordination.  Therefore,   for 24 hours, you have the following restrictions:   - DO NOT drive a car, operate machinery, make legal/financial decisions,   sign important papers or drink alcohol.    ACTIVITY:  Today: no heavy lifting, straining or running due to procedural   sedation/anesthesia.  The following day: return to full activity including work.  DIET:  Eat and drink normally unless instructed otherwise.     TREATMENT FOR COMMON SIDE EFFECTS:  - Mild abdominal pain, nausea, belching, bloating or excessive gas:  rest,   eat lightly and use a heating pad.  - Sore Throat: treat with throat lozenges and/or gargle with warm salt   water.  - Because air was used during the procedure, expelling large amounts of air   from your rectum or belching is normal.  - If a bowel prep was taken, you may not have a bowel movement for 1-3 days.    This is normal.  SYMPTOMS TO WATCH FOR AND REPORT TO YOUR PHYSICIAN:  1. Abdominal pain or bloating, other than gas cramps.  2. Chest pain.  3. Back pain.  4. Signs of infection such as: chills or fever occurring within 24 hours   after the procedure.  5. Rectal bleeding, which would show as bright red, maroon, or black stools.   (A tablespoon of blood from the rectum is not serious, especially if    hemorrhoids are present.)  6. Vomiting.  7. Weakness or dizziness.  GO DIRECTLY TO THE NEAREST EMERGENCY ROOM IF YOU HAVE ANY OF THE FOLLOWING:      Difficulty breathing              Chills and/or fever over 101 F   Persistent vomiting and/or vomiting blood   Severe abdominal pain   Severe chest pain   Black, tarry stools   Bleeding- more than one tablespoon   Any other symptom or condition that you feel may need urgent attention  Your doctor recommends these additional instructions:  If any biopsies were taken, your doctors clinic will contact you in 1 to 2   weeks with any results.  - Discharge patient to home (ambulatory).   - Resume regular diet.   - Continue present medications.   - Await pathology results.   - Repeat colonoscopy in 3 years for surveillance.  For questions, problems or results please call your physician - Zara Drew MD at Work:  [phone].  [facility], EMERGENCY ROOM PHONE NUMBER: [phone]  IF A COMPLICATION OR EMERGENCY SITUATION ARISES AND YOU ARE UNABLE TO REACH   YOUR PHYSICIAN - GO DIRECTLY TO THE EMERGENCY ROOM.  Zara Drew MD  12/15/2023 2:14:34 PM  This report has been verified and signed electronically.  Dear patient,  As a result of recent federal legislation (The Federal Cures Act), you may   receive lab or pathology results from your procedure in your [facility]   account before your physician is able to contact you. Your physician or   their representative will relay the results to you with their   recommendations at their soonest availability.  Thank you,  PROVATION

## 2023-12-15 NOTE — TRANSFER OF CARE
"Anesthesia Transfer of Care Note    Patient: Antione Torres    Procedure(s) Performed: Procedure(s) (LRB):  EGD (ESOPHAGOGASTRODUODENOSCOPY) (N/A)  COLONOSCOPY (N/A)    Patient location: PACU    Anesthesia Type: general    Transport from OR: Transported from OR on room air with adequate spontaneous ventilation    Post pain: adequate analgesia    Post assessment: no apparent anesthetic complications    Post vital signs: stable    Level of consciousness: sedated    Nausea/Vomiting: no nausea/vomiting    Complications: none    Transfer of care protocol was followed      Last vitals: Visit Vitals  BP (!) 178/81 (BP Location: Left arm, Patient Position: Lying)   Pulse 68   Temp 36.6 °C (97.9 °F) (Temporal)   Resp 18   Ht 5' 11" (1.803 m)   Wt 93 kg (205 lb)   SpO2 98%   BMI 28.59 kg/m²     "

## 2023-12-22 LAB
FINAL PATHOLOGIC DIAGNOSIS: NORMAL
GROSS: NORMAL
Lab: NORMAL

## 2023-12-27 ENCOUNTER — TELEPHONE (OUTPATIENT)
Dept: ENDOSCOPY | Facility: HOSPITAL | Age: 73
End: 2023-12-27
Payer: MEDICARE

## 2023-12-27 NOTE — TELEPHONE ENCOUNTER
Contacted the patient to schedule an endoscopy procedure(s) 12/27/23. The patient did not answer the call and left a voice message requesting a call back.

## 2023-12-27 NOTE — TELEPHONE ENCOUNTER
"----- Message from Rosinaamanda Molina sent at 2023  8:18 AM CST -----    ----- Message -----  From: Eliazar Purdy MD  Sent: 2023  10:01 AM CST  To: [facility]    Procedure: EGD with endo-Flip    Diagnosis: Dysphagia  Please repeat esophageal bx distal and proximal for follow up distal lymphocytes.  Also, I know the manometry report is read as normal, but looking at the full report, the upright swallows do have an IRP of 13.5 and upright swallows should be less than 12. And there was incomplete clearance of all swallows both upright and supine as well as fluid noted in the esophageal at end of study. I noticed some tertiary contractions during the EGD as well. Might be a consideration for endoflip     Procedure Timin-12 weeks    #If within 4 weeks selected, please lokesh as high priority#    #If greater than 12 weeks, please select "5-12 weeks" and delay sending until 3 months prior to requested date#     Provider: Dr. Bernardo    Location: No Preference    Additional Scheduling Information: No scheduling concerns    Prep Specifications:Standard prep    Is the patient taking a GLP-1 Agonist:no    Have you attached a patient to this message: yes       "

## 2024-01-08 ENCOUNTER — PATIENT MESSAGE (OUTPATIENT)
Dept: NEUROLOGY | Facility: CLINIC | Age: 74
End: 2024-01-08
Payer: MEDICARE

## 2024-01-08 ENCOUNTER — PATIENT MESSAGE (OUTPATIENT)
Dept: GASTROENTEROLOGY | Facility: CLINIC | Age: 74
End: 2024-01-08
Payer: MEDICARE

## 2024-01-22 ENCOUNTER — OFFICE VISIT (OUTPATIENT)
Dept: NEUROLOGY | Facility: CLINIC | Age: 74
End: 2024-01-22
Payer: MEDICARE

## 2024-01-22 ENCOUNTER — LAB VISIT (OUTPATIENT)
Dept: LAB | Facility: HOSPITAL | Age: 74
End: 2024-01-22
Attending: PSYCHIATRY & NEUROLOGY
Payer: MEDICARE

## 2024-01-22 VITALS — BODY MASS INDEX: 27.81 KG/M2 | HEIGHT: 71 IN | WEIGHT: 198.63 LBS

## 2024-01-22 DIAGNOSIS — M21.371 BILATERAL FOOT-DROP: ICD-10-CM

## 2024-01-22 DIAGNOSIS — C61 CARCINOMA OF PROSTATE: ICD-10-CM

## 2024-01-22 DIAGNOSIS — E78.5 DYSLIPIDEMIA: ICD-10-CM

## 2024-01-22 DIAGNOSIS — G60.9 HEREDITARY AND IDIOPATHIC NEUROPATHY, UNSPECIFIED: Primary | ICD-10-CM

## 2024-01-22 DIAGNOSIS — I15.8 OTHER SECONDARY HYPERTENSION: ICD-10-CM

## 2024-01-22 DIAGNOSIS — G60.9 HEREDITARY AND IDIOPATHIC NEUROPATHY, UNSPECIFIED: ICD-10-CM

## 2024-01-22 DIAGNOSIS — R29.898 DECREASED STRENGTH OF LOWER EXTREMITY: ICD-10-CM

## 2024-01-22 DIAGNOSIS — M21.372 BILATERAL FOOT-DROP: ICD-10-CM

## 2024-01-22 PROCEDURE — 99999 PR PBB SHADOW E&M-EST. PATIENT-LVL III: CPT | Mod: PBBFAC,,, | Performed by: PSYCHIATRY & NEUROLOGY

## 2024-01-22 PROCEDURE — 99215 OFFICE O/P EST HI 40 MIN: CPT | Mod: S$PBB,,, | Performed by: PSYCHIATRY & NEUROLOGY

## 2024-01-22 PROCEDURE — 36415 COLL VENOUS BLD VENIPUNCTURE: CPT | Performed by: PSYCHIATRY & NEUROLOGY

## 2024-01-22 PROCEDURE — 99213 OFFICE O/P EST LOW 20 MIN: CPT | Mod: PBBFAC | Performed by: PSYCHIATRY & NEUROLOGY

## 2024-01-28 PROBLEM — G61.81 CIDP (CHRONIC INFLAMMATORY DEMYELINATING POLYNEUROPATHY): Status: RESOLVED | Noted: 2022-05-25 | Resolved: 2024-01-28

## 2024-01-29 NOTE — PROGRESS NOTES
[facility] - NEUROLOGY 7TH [place] [facility], [place]    Date: 1/22/24  Patient Name: Antione Torres   MRN: [id number]   PCP: JAMEL Farrar (Inactive)      Chief Complaint: Foot Drop and Muscle weakness  Subjective:     Interval History 01/22/2024 - I have reviewed relevant medical records in Epic. Here for routine follow up. 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 Pretty stable since last visit. Has bilateral AFOs that are custom and give good benefit for stability while walking. Fewer falls since getting them.     Still taking Imuran 100/50. Maximum dose would be 225 mg based upon his weight.    History of prostate CA, s/p surgery and no recurrence, no bony lesions. Paraneoplastic Panel done in 2023 was unremarkable.     Interval History 01/28/2024    I have reviewed all relevant history in Epic. The patient presents for routine follow up regarding CIDP and therapy plan. He has an aunt who has drop foot and a cousin who also has drop foot. He presents with his wife who is a nurse manager. He has met with someone who will help to get him an AFO on his right foot. He notes that IVIg did not give him any benefit in the past. He notes that he has had a steady decline in his ability to walk since his onset of symptoms. He has a  who comes to his house and physical therapy. He does two exercise therapies per week. 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     Patient brought lab work from outside provider   Cr- 0.61 on 11/3/22 all other labs WNL       HPI:   Mr. Antione Torres is a 74 y.o. male with HTN, prostate cancer, GERD, and L foot drop presenting for evaluation regarding bilateral foot drop. On chart review the patient was previously seen by Dr. Nuñez. His current therapy  regimen is Imuran 50mg BID. He previously tried prednisone and did not have beneficial results. His initial work up was with [facility]. He denies any problems with swallowing or chewing. He denies any cola colored urine. Does endorse some weakness of the hands but this is attributed to his arthritis denies pain other places on the upper extremities; denies any other upper extremity weakness. Patient uses an AFO which significantly improves his ability to walk he also walks with a walker. He notes that he has weakness from the lower back down.     He notes that he had swelling of the bilateral feet which discontinued with use of the Imuran     Initial presentation- 7-8 years ago with a subtle foot drop of the L foot. He notes he would trip frequently. Per the patient and his wife he noticed more of his symptoms during lockdown because patient and his wife were walking frequently and he was having difficulty with walking.     Notes that he had frequent burning/tingling of the bilateral hands and feet in his 40s he notes that it would happen off and on for several years but it no longer occurs. He denies any current numbness and tingling he finds that both feet have some numbness/tingling.     Prostate carcinoma- Sx 20 years ago, Radiation 11 years ago      Note from Dr. Nuñez on 06/30/2022-   Hx of IVIg infusions   LP showed borderline? Unclear what protein level was   Sural Nerve Bx- WNL   EMG- peripheral neuropathy     Of note no EMG records in [facility].     PAST MEDICAL HISTORY:  Past Medical History:   Diagnosis Date    Arthritis     Cancer 2000    prostate    Depression     Digestive disorder     Hyperlipidemia     Hypertension     Sleep apnea        PAST SURGICAL HISTORY:  Past Surgical History:   Procedure Laterality Date    CHOLECYSTECTOMY      COLONOSCOPY      COLONOSCOPY N/A 7/25/2016    Procedure: COLONOSCOPY;  Surgeon: Stanley Woo MD;  Location: [facility] ([address] [place] [address]);  Service: Endoscopy;   Laterality: N/A;    COLONOSCOPY N/A 12/15/2023    Procedure: COLONOSCOPY;  Surgeon: Zara Drew MD;  Location: [facility] ENDOSCOPY;  Service: Endoscopy;  Laterality: N/A;  Pt ok with any provider to have procedure before end of year / PEG / Inst portal-   12/8- pre call complete. DBM    ESOPHAGEAL MANOMETRY WITH MEASUREMENT OF IMPEDANCE N/A 9/27/2023    Procedure: MANOMETRY, ESOPHAGUS, WITH IMPEDANCE MEASUREMENT;  Surgeon: Chuy Carreno MD;  Location: [facility] (4TH FLR);  Service: Endoscopy;  Laterality: N/A;  esophageal motility for evaluation of dysphagia intermittent solid food dysphagia proximal esophageal region  Referred by: Dr. Purdy  Route instructions sent: [id number]  09/20/2023 - precall completed, arrival, location, no ride neede    ESOPHAGOGASTRODUODENOSCOPY N/A 8/21/2023    Procedure: EGD (ESOPHAGOGASTRODUODENOSCOPY);  Surgeon: Nasir Mann MD;  Location: [address]);  Service: Endoscopy;  Laterality: N/A;  Procedure Timing: < 2 week  Provider: Any endoscopist   Location: No Preference   Additional Scheduling Information: No scheduling concerns  8/16 ref. by Dr. Purdy, instr. to [facility]    ESOPHAGOGASTRODUODENOSCOPY N/A 12/15/2023    Procedure: EGD (ESOPHAGOGASTRODUODENOSCOPY);  Surgeon: Zara Drew MD;  Location: [facility] ENDOSCOPY;  Service: Endoscopy;  Laterality: N/A;    HERNIA REPAIR      NECK SURGERY      PROSTATE SURGERY         CURRENT MEDS:  Current Outpatient Medications   Medication Sig Dispense Refill    acetaminophen (TYLENOL) 650 MG TbSR 2 tablets as needed      ALPRAZolam (XANAX) 2 MG Tab Take 1 tablet (2 mg total) by mouth On call Procedure for Anxiety. 2 tablet 0    atorvastatin (LIPITOR) 80 MG tablet 80 mg.      azaTHIOprine (IMURAN) 50 mg Tab Take 2 tablets (100 mg total) by mouth every morning AND 1 tablet (50 mg total) every evening. 90 tablet 11    cannabidioL (EPIDIOLEX) 100 mg/mL as directed      cholecalciferol, vitamin D3, (VITAMIN D3) 50 mcg  (2,000 unit) Tab TAKE 2000UNIT BY MOUTH ONCE DAILY FOR VITAMIN D.  TAKE WITH FOOD.      DULoxetine (CYMBALTA) 30 MG capsule Take 3 capsules by mouth once daily.      ezetimibe (ZETIA) 10 mg tablet Take 10 mg by mouth once daily.      losartan (COZAAR) 50 MG tablet Take 50 mg by mouth once daily.      multivitamin (THERAGRAN) per tablet Take 1 tablet by mouth once daily.      oxybutynin (DITROPAN) 5 MG Tab Take 1 tablet (5 mg total) by mouth 3 (three) times daily. 30 tablet 11    paroxetine (PAXIL) 30 MG tablet Take 30 mg by mouth every morning.      prazosin (MINIPRESS) 5 MG capsule Take 1 capsule by mouth every evening.      RABEprazole (ACIPHEX) 20 mg tablet Take 1 tablet (20 mg total) by mouth before breakfast. 90 tablet 1    zinc sulfate (ZINC-15) 66 mg Tab 2 tablets       No current facility-administered medications for this visit.       ALLERGIES:  Review of patient's allergies indicates:   Allergen Reactions    Pcn [penicillins] Hives, Swelling and Rash       FAMILY HISTORY:  Family History   Problem Relation Age of Onset    Pancreatic cancer Maternal Uncle 75    Celiac disease Neg Hx     Cirrhosis Neg Hx     Colon cancer Neg Hx     Colon polyps Neg Hx     Esophageal cancer Neg Hx     Inflammatory bowel disease Neg Hx     Liver cancer Neg Hx     Rectal cancer Neg Hx     Stomach cancer Neg Hx     Ulcerative colitis Neg Hx     Pancreatitis Neg Hx     Kidney cancer Neg Hx     Dupont's esophagus Neg Hx     Bladder Cancer Neg Hx     Uterine cancer Neg Hx     Ovarian cancer Neg Hx     Crohn's disease Neg Hx     Irritable bowel syndrome Neg Hx     Liver disease Neg Hx     Hemochromatosis Neg Hx        SOCIAL HISTORY:  Social History     Tobacco Use    Smoking status: Former     Current packs/day: 0.00     Types: Cigarettes     Quit date: 1999     Years since quittin.2    Smokeless tobacco: Never    Tobacco comments:     cigarettes   Substance Use Topics    Alcohol use: Not Currently     Alcohol/week: 1.0  "standard drink of alcohol     Types: 1 Shots of liquor per week     Comment: moderate    Drug use: No       Review of Systems:  Review of Systems   Constitutional:  Negative for chills, fever, malaise/fatigue and weight loss.   HENT:  Negative for ear discharge, ear pain, hearing loss, sinus pain, sore throat and tinnitus.    Eyes:  Negative for blurred vision, double vision and photophobia.   Respiratory:  Negative for cough, shortness of breath and wheezing.    Cardiovascular:  Negative for chest pain, palpitations, orthopnea and leg swelling (States previously had this but it improved on the Imuran).   Gastrointestinal:  Positive for diarrhea and nausea. Negative for abdominal pain, constipation and vomiting.   Genitourinary:  Negative for dysuria, frequency and urgency.   Musculoskeletal:  Positive for back pain (2/2 Arthritis) and falls (regular falls with loss of balance he notes that he does not attempt to catch himself). Negative for myalgias and neck pain.   Neurological:  Positive for weakness. Negative for tingling, seizures, loss of consciousness and headaches.            Objective:     Vitals:    01/22/24 1513   Weight: 90.1 kg (198 lb 10.2 oz)   Height: 5' 11" (1.803 m)           Lab Results   Component Value Date    WBC 5.90 08/17/2023    HGB 13.7 (L) 08/17/2023    HCT 39.9 (L) 08/17/2023     08/17/2023    CHOL 176 08/27/2018    TRIG 125 08/27/2018    HDL 44 08/27/2018    ALT 37 08/17/2023    AST 33 08/17/2023     08/17/2023    K 3.9 08/17/2023     08/17/2023    CREATININE 0.61 08/17/2023    BUN 9 08/17/2023    CO2 26 08/17/2023    TSH 3.000 08/17/2023    [id number] 1041 (H) 08/17/2023       NEUROLOGICAL EXAMINATION:     MENTAL STATUS   Oriented to person, place, and time.   Level of consciousness: alert    MOTOR EXAM   Muscle bulk: normal  Overall muscle tone: normal    Strength   Strength 5/5 except as noted.   Right iliopsoas: 3/5  Left iliopsoas: 3/5       L Iliopsoas 3-/5  R " Iliopsoas 3-/5    L Knee Extension 4-/5   L Knee Flexion 4-/5    R Knee Extension 4/5  R Knee Flexion 5/5    R Dorsiflexion 2/5  L Dorsiflexion 0/5    R Plantar Flexion 3/5  L Plantar Flexion 4-/5    R Internal Foot Rotation 3/5  L Internal Foot Rotation 3-/5    R External Foot Rotation 3/5  L External Foot Rotation 0/5     REFLEXES     Reflexes   Right brachioradialis: 2+  Left brachioradialis: 2+  Right biceps: 2+  Left biceps: 2+  Right triceps: 2+  Left triceps: 2+  Right patellar: 1+  Left patellar: 1+  Right achilles: 0  Left achilles: 0  Right plantar: equivocal  Left plantar: equivocal  Right Chapman: absent  Left Chapman: absent  Right ankle clonus: absent  Left ankle clonus: absent    SENSORY EXAM   Right leg vibration: decreased from toes  Left leg vibration: decreased from ankle  Right arm pinprick: decreased from elbow  Left arm pinprick: decreased from elbow  Right leg pinprick: absent above the knee bilaterally.  Left leg pinprick: absent above the knee bilaterally.    GAIT AND COORDINATION     Tremor   Resting tremor: absent  Intention tremor: absent  Action tremor: absent       Bilateral Foot Drop noted      ? ?        Assessment:   Antione Torres is a 74 y.o. male presenting for evaluation regarding bilateral foot drop and muscle weakness.     Plan:   Discussed with the patient doing genetic testing for CMT and patient would like to move forward with this study. Presentation and exam findings are not so much consistent with CIDP, and there are no objective measures in the patient chart to corroborate the diagnosis of CIDP.     Paraneoplastic workup for the patient to evaluate is there is a possible issue with metabolic disorder    Possibly repeat EMG pending the genetic testing       Problem List Items Addressed This Visit       Decreased strength of lower extremity    Bilateral foot-drop    Current Assessment & Plan     Uncertain etiology. Recent EMG results are not at all consistent with CIDP.  There was found severe axonal sensorimotor polyneuropathy in UEs and LEs with widespread active denervation (fibs). No concern for ALS/MND at this time. No bulbar symptoms, duration of symptoms is many years. Given the h/o prostate carcinoma, a paraneoplastic process is possible. Panel in 2023 was unremarkable, though it should be repeated annually for 3+ years to be certain. Mr. Torres believes that chemical exposure from work ([employer]) and/or Agent Orange exposure in [place] may be implicated. Tolerating Imuran 100/50 well.I reviewed labs done recently in another system and WBC, platelets, LFTs were all WNL. Labs reviewed on patient's phone via EMR access to another system.   - Continue Imuran 100/50. He will alert me to worsening of symptoms if they occur. If so, we can increase Imuran. Based upoin his weight, max dose would be 225 mg daily.   - Paraneoplastic Panel ordered.         Carcinoma of prostate    Overview     Apr 26, 2017 Entered By: MALINI KRUSE Comment: s/p radical prostatectomy [address] [facility] [address]         Dyslipidemia    Current Assessment & Plan     On appropriate medications and managed by PCP. We discussed the importance of managing all secondary stroke risk factors, including hyperlipidemia.           Hypertension    Current Assessment & Plan     On appropriate medications and managed by PCP. We discussed the importance of managing all secondary stroke risk factors, including hypertension.            Other Visit Diagnoses       Hereditary and idiopathic neuropathy, unspecified    -  Primary    Relevant Orders    Paraneoplastic Autoantibody Evaulation, Serum                I spent a total of 60 minutes on the day of the visit. This includes face to face time and non-face to face time preparing to see the patient (eg, review of tests), obtaining and/or reviewing separately obtained history, documenting clinical information in the electronic or other health record, independently interpreting  results and communicating results to the patient/family/caregiver, or care coordinator.    Rick Posey MD  [facility]

## 2024-01-29 NOTE — ASSESSMENT & PLAN NOTE
Uncertain etiology. Recent EMG results are not at all consistent with CIDP. There was found severe axonal sensorimotor polyneuropathy in UEs and LEs with widespread active denervation (fibs). No concern for ALS/MND at this time. No bulbar symptoms, duration of symptoms is many years. Given the h/o prostate carcinoma, a paraneoplastic process is possible. Panel in 2023 was unremarkable, though it should be repeated annually for 3+ years to be certain. Mr. Torres believes that chemical exposure from work ([employer]) and/or Agent Orange exposure in [place] may be implicated. Tolerating Imuran 100/50 well.I reviewed labs done recently in another system and WBC, platelets, LFTs were all WNL. Labs reviewed on patient's phone via EMR access to another system.   - Continue Imuran 100/50. He will alert me to worsening of symptoms if they occur. If so, we can increase Imuran. Based upoin his weight, max dose would be 225 mg daily.   - Paraneoplastic Panel ordered.

## 2024-01-30 LAB
AMPHIPHYSIN AB TITR SER: NEGATIVE {TITER}
ANNOTATION COMMENT IMP: NORMAL
CV2 IGG TITR SER: NEGATIVE {TITER}
GLIAL NUC TYPE 1 AB TITR SER: NEGATIVE {TITER}
HU1 AB TITR SER: NEGATIVE {TITER}
HU2 AB TITR SER IF: NEGATIVE {TITER}
HU3 AB TITR SER: NEGATIVE {TITER}
IMMUNOLOGIST REVIEW: NORMAL
PCA-1 AB TITR SER: NEGATIVE {TITER}
PCA-TR AB TITR SER: NEGATIVE {TITER}
PURKINJE CELL CYTOPLASMIC AB TYPE2: NEGATIVE
VGCC-P/Q BIND AB SER-SCNC: 0 NMOL/L
VGKC AB SER-SCNC: 0 NMOL/L

## 2024-03-07 ENCOUNTER — PATIENT MESSAGE (OUTPATIENT)
Dept: NEUROLOGY | Facility: CLINIC | Age: 74
End: 2024-03-07
Payer: MEDICARE

## 2024-03-26 ENCOUNTER — PATIENT MESSAGE (OUTPATIENT)
Dept: GASTROENTEROLOGY | Facility: CLINIC | Age: 74
End: 2024-03-26
Payer: MEDICARE

## 2024-09-23 ENCOUNTER — TELEPHONE (OUTPATIENT)
Dept: NEUROLOGY | Facility: CLINIC | Age: 74
End: 2024-09-23
Payer: MEDICARE

## 2024-09-23 ENCOUNTER — PATIENT MESSAGE (OUTPATIENT)
Dept: NEUROLOGY | Facility: CLINIC | Age: 74
End: 2024-09-23
Payer: MEDICARE

## 2024-09-23 DIAGNOSIS — G61.81 CIDP (CHRONIC INFLAMMATORY DEMYELINATING POLYNEUROPATHY): ICD-10-CM

## 2024-09-23 DIAGNOSIS — Z79.899 IMMUNOSUPPRESSION DUE TO DRUG THERAPY: Primary | ICD-10-CM

## 2024-09-23 DIAGNOSIS — D84.821 IMMUNOSUPPRESSION DUE TO DRUG THERAPY: Primary | ICD-10-CM

## 2024-09-23 RX ORDER — AZATHIOPRINE 50 MG/1
TABLET ORAL
Qty: 270 TABLET | Refills: 3 | Status: SHIPPED | OUTPATIENT
Start: 2024-09-23 | End: 2025-09-23

## 2024-09-26 ENCOUNTER — TELEPHONE (OUTPATIENT)
Dept: NEUROLOGY | Facility: CLINIC | Age: 74
End: 2024-09-26
Payer: MEDICARE

## 2024-09-26 NOTE — TELEPHONE ENCOUNTER
Prior Authorization    Message sent to Rupali to notify of PA needed for Imuran.  Pharmacy: [facility]  Prescriber: MD Frederic Pablo RN, BSN, BS  [facility] Clinical Care Coordinator  [phone]

## 2024-10-09 DIAGNOSIS — G61.81 CIDP (CHRONIC INFLAMMATORY DEMYELINATING POLYNEUROPATHY): ICD-10-CM

## 2024-10-09 RX ORDER — AZATHIOPRINE 50 MG/1
TABLET ORAL
Qty: 270 TABLET | Refills: 3 | Status: SHIPPED | OUTPATIENT
Start: 2024-10-09 | End: 2025-10-09

## 2024-10-14 ENCOUNTER — OFFICE VISIT (OUTPATIENT)
Dept: NEUROLOGY | Facility: CLINIC | Age: 74
End: 2024-10-14
Payer: MEDICARE

## 2024-10-14 VITALS
DIASTOLIC BLOOD PRESSURE: 71 MMHG | WEIGHT: 183.63 LBS | HEART RATE: 71 BPM | HEIGHT: 71 IN | BODY MASS INDEX: 25.71 KG/M2 | SYSTOLIC BLOOD PRESSURE: 148 MMHG

## 2024-10-14 DIAGNOSIS — R29.898 DECREASED STRENGTH OF LOWER EXTREMITY: ICD-10-CM

## 2024-10-14 DIAGNOSIS — G60.8 PERIPHERAL SENSORY-MOTOR AXONAL POLYNEUROPATHY: Primary | ICD-10-CM

## 2024-10-14 DIAGNOSIS — M21.371 BILATERAL FOOT-DROP: ICD-10-CM

## 2024-10-14 DIAGNOSIS — M21.372 BILATERAL FOOT-DROP: ICD-10-CM

## 2024-10-14 PROCEDURE — 99214 OFFICE O/P EST MOD 30 MIN: CPT | Mod: S$PBB,,, | Performed by: PSYCHIATRY & NEUROLOGY

## 2024-10-14 PROCEDURE — 99999 PR PBB SHADOW E&M-EST. PATIENT-LVL III: CPT | Mod: PBBFAC,,, | Performed by: PSYCHIATRY & NEUROLOGY

## 2024-10-14 PROCEDURE — 99213 OFFICE O/P EST LOW 20 MIN: CPT | Mod: PBBFAC | Performed by: PSYCHIATRY & NEUROLOGY

## 2024-10-15 PROBLEM — G60.8 PERIPHERAL SENSORY-MOTOR AXONAL POLYNEUROPATHY: Status: ACTIVE | Noted: 2024-10-15

## 2024-10-15 NOTE — PROGRESS NOTES
[facility] - NEUROLOGY 7TH [address] [facility], [place]    Date: 10/14/24  Patient Name: Antione Torres   MRN: [id number]   PCP: JAMEL Farrar (Inactive)      Chief Complaint: Foot Drop and Muscle weakness  Subjective:     Interval History 10/14/2024 - I have reviewed relevant medical records in [facility]. At baseline. He remains active at home and in the garden, though needs to take rest periods throughout the day. He wears AFOs and uses walker. No recent falls. He has been tolerating Imuran 100/50 without GI upset. Labs done last week show normal WBC, platelets, LFTs.    Interval History 01/22/2024 - I have reviewed relevant medical records in Epic. Here for routine follow up. 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 Pretty stable since last visit. Has bilateral AFOs that are custom and give good benefit for stability while walking. Fewer falls since getting them.     Still taking Imuran 100/50. Maximum dose would be 225 mg based upon his weight.    History of prostate CA, s/p surgery and no recurrence, no bony lesions. Paraneoplastic Panel done in 2023 was unremarkable.     Interval History 10/15/2024    I have reviewed all relevant history in Epic. The patient presents for routine follow up regarding CIDP and therapy plan. He has an aunt who has drop foot and a cousin who also has drop foot. He presents with his wife who is a nurse manager. He has met with someone who will help to get him an AFO on his right foot. He notes that IVIg did not give him any benefit in the past. He notes that he has had a steady decline in his ability to walk since his onset of symptoms. He has a  who comes to his house and physical therapy. He does two exercise therapies per week. 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     Patient brought lab work from outside provider   Cr- 0.61 on 11/3/22 all other labs WNL       HPI:   Mr. Antione Torres is a 74 y.o. male with HTN, prostate cancer, GERD, and L foot drop presenting for evaluation regarding bilateral foot drop. On chart review the patient was previously seen by Dr. Nuñez. His current therapy regimen is Imuran 50mg BID. He previously tried prednisone and did not have beneficial results. His initial work up was with [facility]. He denies any problems with swallowing or chewing. He denies any cola colored urine. Does endorse some weakness of the hands but this is attributed to his arthritis denies pain other places on the upper extremities; denies any other upper extremity weakness. Patient uses an AFO which significantly improves his ability to walk he also walks with a walker. He notes that he has weakness from the lower back down.     He notes that he had swelling of the bilateral feet which discontinued with use of the Imuran     Initial presentation- 7-8 years ago with a subtle foot drop of the L foot. He notes he would trip frequently. Per the patient and his wife he noticed more of his symptoms during lockdown because patient and his wife were walking frequently and he was having difficulty with walking.     Notes that he had frequent burning/tingling of the bilateral hands and feet in his 40s he notes that it would happen off and on for several years but it no longer occurs. He denies any current numbness and tingling he finds that both feet have some numbness/tingling.     Prostate carcinoma- Sx 20 years ago, Radiation 11 years ago      Note from Dr. Nuñez on 06/30/2022-   Hx of IVIg infusions   LP showed borderline? Unclear what protein level was   Sural Nerve Bx- WNL   EMG- peripheral neuropathy     Of note no EM[facility] in [facility].     PAST MEDICAL HISTORY:  Past Medical History:   Diagnosis Date    Arthritis     Cancer 2000    prostate     Depression     Digestive disorder     Hyperlipidemia     Hypertension     Sleep apnea        PAST SURGICAL HISTORY:  Past Surgical History:   Procedure Laterality Date    CHOLECYSTECTOMY      COLONOSCOPY      COLONOSCOPY N/A 7/25/2016    Procedure: COLONOSCOPY;  Surgeon: Stanley Woo MD;  [facility]  Service: Endoscopy;  Laterality: N/A;    COLONOSCOPY N/A 12/15/2023    Procedure: COLONOSCOPY;  Surgeon: Zara Drew MD;  Location: [facility] ENDOSCOPY;  Service: Endoscopy;  Laterality: N/A;  Pt ok with any provider to have procedure before end of year / PEG / Inst portal- LW  12/8- pre call complete. DBM    ESOPHAGEAL MANOMETRY WITH MEASUREMENT OF IMPEDANCE N/A 9/27/2023    Procedure: MANOMETRY, ESOPHAGUS, WITH IMPEDANCE MEASUREMENT;  Surgeon: Chuy Carreno MD;  Location: [facility] ([address] [place] [address]);  Service: Endoscopy;  Laterality: N/A;  esophageal motility for evaluation of dysphagia intermittent solid food dysphagia proximal esophageal region  Referred by: Dr. Purdy  Route instructions sent: [id number]  09/20/2023 - precall completed, arrival, location, no ride neede    ESOPHAGOGASTRODUODENOSCOPY N/A 8/21/2023    Procedure: EGD (ESOPHAGOGASTRODUODENOSCOPY);  Surgeon: Nasir Mann MD;  Location: [facility] ([address] [place] [address]);  Service: Endoscopy;  Laterality: N/A;  Procedure Timing: < 2 week  Provider: Any endoscopist   Location: No Preference   Additional Scheduling Information: No scheduling concerns  8/16 ref. by Dr. Purdy, instr. to [facility]    ESOPHAGOGASTRODUODENOSCOPY N/A 12/15/2023    Procedure: EGD (ESOPHAGOGASTRODUODENOSCOPY);  Surgeon: Zara Drew MD;  Location: [facility] ENDOSCOPY;  Service: Endoscopy;  Laterality: N/A;    HERNIA REPAIR      NECK SURGERY      PROSTATE SURGERY         CURRENT MEDS:  Current Outpatient Medications   Medication Sig Dispense Refill    acetaminophen (TYLENOL) 650 MG TbSR 2 tablets as needed      ALPRAZolam (XANAX) 2 MG Tab Take 1 tablet (2 mg total)  by mouth On call Procedure for Anxiety. 2 tablet 0    atorvastatin (LIPITOR) 80 MG tablet 80 mg.      azaTHIOprine (IMURAN) 50 mg Tab Take 2 tablets (100 mg total) by mouth every morning AND 1 tablet (50 mg total) every evening. 270 tablet 3    cannabidioL (EPIDIOLEX) 100 mg/mL as directed      cholecalciferol, vitamin D3, (VITAMIN D3) 50 mcg (2,000 unit) Tab TAKE 2000UNIT BY MOUTH ONCE DAILY FOR VITAMIN D.  TAKE WITH FOOD.      DULoxetine (CYMBALTA) 30 MG capsule Take 3 capsules by mouth once daily.      ezetimibe (ZETIA) 10 mg tablet Take 10 mg by mouth once daily.      losartan (COZAAR) 50 MG tablet Take 50 mg by mouth once daily.      multivitamin (THERAGRAN) per tablet Take 1 tablet by mouth once daily.      oxybutynin (DITROPAN) 5 MG Tab Take 1 tablet (5 mg total) by mouth 3 (three) times daily. 30 tablet 11    paroxetine (PAXIL) 30 MG tablet Take 30 mg by mouth every morning.      prazosin (MINIPRESS) 5 MG capsule Take 1 capsule by mouth every evening.      RABEprazole (ACIPHEX) 20 mg tablet Take 1 tablet (20 mg total) by mouth before breakfast. 90 tablet 1    zinc sulfate (ZINC-15) 66 mg Tab 2 tablets       No current facility-administered medications for this visit.       ALLERGIES:  Review of patient's allergies indicates:   Allergen Reactions    Pcn [penicillins] Hives, Swelling and Rash       FAMILY HISTORY:  Family History   Problem Relation Name Age of Onset    Pancreatic cancer Maternal Uncle Crow 75    Celiac disease Neg Hx      Cirrhosis Neg Hx      Colon cancer Neg Hx      Colon polyps Neg Hx      Esophageal cancer Neg Hx      Inflammatory bowel disease Neg Hx      Liver cancer Neg Hx      Rectal cancer Neg Hx      Stomach cancer Neg Hx      Ulcerative colitis Neg Hx      Pancreatitis Neg Hx      Kidney cancer Neg Hx      Dupont's esophagus Neg Hx      Bladder Cancer Neg Hx      Uterine cancer Neg Hx      Ovarian cancer Neg Hx      Crohn's disease Neg Hx      Irritable bowel syndrome Neg Hx       "Liver disease Neg Hx      Hemochromatosis Neg Hx         SOCIAL HISTORY:  Social History     Tobacco Use    Smoking status: Former     Current packs/day: 0.00     Types: Cigarettes     Quit date: 1999     Years since quittin.9    Smokeless tobacco: Never    Tobacco comments:     cigarettes   Substance Use Topics    Alcohol use: Not Currently     Alcohol/week: 1.0 standard drink of alcohol     Types: 1 Shots of liquor per week     Comment: moderate    Drug use: No       Review of Systems:  Review of Systems   Constitutional:  Negative for chills, fever, malaise/fatigue and weight loss.   HENT:  Negative for ear discharge, ear pain, hearing loss, sinus pain, sore throat and tinnitus.    Eyes:  Negative for blurred vision, double vision and photophobia.   Respiratory:  Negative for cough, shortness of breath and wheezing.    Cardiovascular:  Negative for chest pain, palpitations, orthopnea and leg swelling (States previously had this but it improved on the Imuran).   Gastrointestinal:  Positive for diarrhea and nausea. Negative for abdominal pain, constipation and vomiting.   Genitourinary:  Negative for dysuria, frequency and urgency.   Musculoskeletal:  Positive for back pain (2/2 Arthritis) and falls (regular falls with loss of balance he notes that he does not attempt to catch himself). Negative for myalgias and neck pain.   Neurological:  Positive for weakness. Negative for tingling, seizures, loss of consciousness and headaches.            Objective:     Vitals:    10/14/24 0941   BP: (!) 148/71   Pulse: 71   Weight: 83.3 kg (183 lb 10.3 oz)   Height: 5' 11" (1.803 m)           Lab Results   Component Value Date    WBC 6.31 10/02/2024    HGB 13.2 (L) 10/02/2024    HCT 38.5 (L) 10/02/2024     10/02/2024    CHOL 176 2018    TRIG 125 2018    HDL 44 2018    ALT 35 10/02/2024    AST 33 10/02/2024     2023    K 3.9 2023     2023    CREATININE 0.61 " 08/17/2023    BUN 9 08/17/2023    CO2 26 08/17/2023    TSH 3.000 08/17/2023    [id number] 1041 (H) 08/17/2023       NEUROLOGICAL EXAMINATION:     MENTAL STATUS   Oriented to person, place, and time.   Level of consciousness: alert    MOTOR EXAM   Muscle bulk: normal  Overall muscle tone: normal    Strength   Strength 5/5 except as noted.   Right iliopsoas: 3/5  Left iliopsoas: 3/5       L Iliopsoas 3-/5  R Iliopsoas 3-/5    L Knee Extension 4-/5   L Knee Flexion 4-/5    R Knee Extension 4/5  R Knee Flexion 5/5    R Dorsiflexion 2/5  L Dorsiflexion 0/5    R Plantar Flexion 3/5  L Plantar Flexion 4-/5    R Internal Foot Rotation 3/5  L Internal Foot Rotation 3-/5    R External Foot Rotation 3/5  L External Foot Rotation 0/5     REFLEXES     Reflexes   Right brachioradialis: 2+  Left brachioradialis: 2+  Right biceps: 2+  Left biceps: 2+  Right triceps: 2+  Left triceps: 2+  Right patellar: 1+  Left patellar: 1+  Right achilles: 0  Left achilles: 0  Right plantar: equivocal  Left plantar: equivocal  Right Chapman: absent  Left Chapman: absent  Right ankle clonus: absent  Left ankle clonus: absent    SENSORY EXAM   Right leg light touch: decreased from knee  Left leg light touch: decreased from knee  Right leg vibration: decreased from toes  Left leg vibration: decreased from ankle  Right arm pinprick: decreased from elbow  Left arm pinprick: decreased from elbow  Right leg pinprick: absent above the knee bilaterally.  Left leg pinprick: absent above the knee bilaterally.    GAIT AND COORDINATION     Tremor   Resting tremor: absent  Intention tremor: absent  Action tremor: absent       Bilateral Foot Drop noted      ? ?        Assessment:   Antione Torres is a 74 y.o. male presenting for evaluation regarding bilateral foot drop and muscle weakness.     Plan:       Problem List Items Addressed This Visit       Decreased strength of lower extremity    Bilateral foot-drop    Peripheral sensory-motor axonal polyneuropathy -  Primary    Current Assessment & Plan     Unclear etiology. Severe sensory and motor axonal losses on NCS. Progression slowed since on Imuran, which he has been tolerating well.   - Continue Imuran 100/50   - Labs done last week with normal wbc, platelets, and hepatic function.                  I spent a total of 30 minutes on the day of the visit. This includes face to face time and non-face to face time preparing to see the patient (eg, review of tests), obtaining and/or reviewing separately obtained history, documenting clinical information in the electronic or other health record, independently interpreting results and communicating results to the patient/family/caregiver, or care coordinator.    Rick Posey MD  [facility]

## 2024-10-15 NOTE — ASSESSMENT & PLAN NOTE
Unclear etiology. Severe sensory and motor axonal losses on NCS. Progression slowed since on Imuran, which he has been tolerating well.   - Continue Imuran 100/50   - Labs done last week with normal wbc, platelets, and hepatic function.

## 2024-12-24 ENCOUNTER — OFFICE VISIT (OUTPATIENT)
Dept: CARDIOLOGY | Facility: CLINIC | Age: 74
End: 2024-12-24
Payer: MEDICARE

## 2024-12-24 DIAGNOSIS — I82.4Y9 DEEP VEIN THROMBOSIS (DVT) OF PROXIMAL LOWER EXTREMITY, UNSPECIFIED CHRONICITY, UNSPECIFIED LATERALITY: ICD-10-CM

## 2024-12-24 DIAGNOSIS — E78.5 DYSLIPIDEMIA: ICD-10-CM

## 2024-12-24 DIAGNOSIS — R42 DIZZINESS: Primary | ICD-10-CM

## 2024-12-24 DIAGNOSIS — G60.8 PERIPHERAL SENSORY-MOTOR AXONAL POLYNEUROPATHY: ICD-10-CM

## 2024-12-24 PROCEDURE — 93005 ELECTROCARDIOGRAM TRACING: CPT | Mod: PBBFAC | Performed by: INTERNAL MEDICINE

## 2024-12-24 PROCEDURE — 99999 PR PBB SHADOW E&M-EST. PATIENT-LVL II: CPT | Mod: PBBFAC,,, | Performed by: INTERNAL MEDICINE

## 2024-12-24 PROCEDURE — 93010 ELECTROCARDIOGRAM REPORT: CPT | Mod: S$PBB,,, | Performed by: INTERNAL MEDICINE

## 2024-12-24 PROCEDURE — 99203 OFFICE O/P NEW LOW 30 MIN: CPT | Mod: S$PBB,,, | Performed by: INTERNAL MEDICINE

## 2024-12-24 PROCEDURE — 99212 OFFICE O/P EST SF 10 MIN: CPT | Mod: PBBFAC,25 | Performed by: INTERNAL MEDICINE

## 2024-12-24 NOTE — PROGRESS NOTES
Subjective:   12/24/2024     Patient ID:  Antione Torres is a 74 y.o. male [facility] for recurrent dizziness, BLE)      Here for cardiac follow-up.  He has had dizziness for years, this can occur with standing or sitting, no specific precipitating events.  Does feel like his vision is dimming.  He has never passed out.  He has had this for many years without change.  At 1 time felt to have Meniere's disease.      Today, blood pressure shows no drop with standing.  EKG is normal.      He is not having chest pains or tightness, no PND or orthopnea.    Hypercholesterolemia is treated with high-dose statin plus ezetimibe.      Hypertension controlled on losartan.        Past Medical History:   Diagnosis Date    Arthritis     Cancer 2000    prostate    Depression     Digestive disorder     Hypertension     Sleep apnea        Review of patient's allergies indicates:   Allergen Reactions    Pcn [penicillins] Hives, Swelling and Rash         Current Outpatient Medications:     acetaminophen (TYLENOL) 650 MG TbSR, 2 tablets as needed, Disp: , Rfl:     ALPRAZolam (XANAX) 2 MG Tab, Take 1 tablet (2 mg total) by mouth On call Procedure for Anxiety., Disp: 2 tablet, Rfl: 0    atorvastatin (LIPITOR) 80 MG tablet, 80 mg., Disp: , Rfl:     azaTHIOprine (IMURAN) 50 mg Tab, Take 2 tablets (100 mg total) by mouth every morning AND 1 tablet (50 mg total) every evening., Disp: 270 tablet, Rfl: 3    cannabidioL (EPIDIOLEX) 100 mg/mL, as directed, Disp: , Rfl:     cholecalciferol, vitamin D3, (VITAMIN D3) 50 mcg (2,000 unit) Tab, TAKE 2000UNIT BY MOUTH ONCE DAILY FOR VITAMIN D.  TAKE WITH FOOD., Disp: , Rfl:     DULoxetine (CYMBALTA) 30 MG capsule, Take 3 capsules by mouth once daily., Disp: , Rfl:     ezetimibe (ZETIA) 10 mg tablet, Take 10 mg by mouth once daily., Disp: , Rfl:     losartan (COZAAR) 50 MG tablet, Take 50 mg by mouth once daily., Disp: , Rfl:     multivitamin (THERAGRAN) per  tablet, Take 1 tablet by mouth once daily., Disp: , Rfl:     oxybutynin (DITROPAN) 5 MG Tab, Take 1 tablet (5 mg total) by mouth 3 (three) times daily., Disp: 30 tablet, Rfl: 11    paroxetine (PAXIL) 30 MG tablet, Take 30 mg by mouth every morning., Disp: , Rfl:     prazosin (MINIPRESS) 5 MG capsule, Take 1 capsule by mouth every evening., Disp: , Rfl:     RABEprazole (ACIPHEX) 20 mg tablet, Take 1 tablet (20 mg total) by mouth before breakfast., Disp: 90 tablet, Rfl: 1    zinc sulfate (ZINC-15) 66 mg Tab, 2 tablets, Disp: , Rfl:      Objective:   Review of Systems   Cardiovascular:  Negative for chest pain, claudication, cyanosis, dyspnea on exertion, irregular heartbeat, leg swelling, near-syncope, orthopnea, palpitations, paroxysmal nocturnal dyspnea and syncope.   Neurological:  Positive for dizziness.         There were no vitals filed for this visit.  Wt Readings from Last 3 Encounters:   10/14/24 83.3 kg (183 lb 10.3 oz)   01/22/24 90.1 kg (198 lb 10.2 oz)   12/15/23 93 kg (205 lb)     Temp Readings from Last 3 Encounters:   12/15/23 98.8 °F (37.1 °C) (Temporal)   09/27/23 98.1 °F (36.7 °C) (Temporal)   08/21/23 97.7 °F (36.5 °C)     BP Readings from Last 3 Encounters:   10/14/24 (!) 148/71   12/15/23 (!) 158/67   09/27/23 (!) 151/70     Pulse Readings from Last 3 Encounters:   10/14/24 71   12/15/23 67   09/27/23 98             Physical Exam  Vitals reviewed.   Constitutional:       General: He is not in acute distress.     Appearance: He is well-developed.   HENT:      Head: Normocephalic and atraumatic.      Nose: Nose normal.   Eyes:      Conjunctiva/sclera: Conjunctivae normal.      Pupils: Pupils are equal, round, and reactive to light.   Neck:      Vascular: No JVD.   Cardiovascular:      Rate and Rhythm: Normal rate and regular rhythm.      Pulses: Intact distal pulses.      Heart sounds: Normal heart sounds. No murmur heard.     No friction rub. No gallop.   Pulmonary:      Effort: Pulmonary effort  is normal. No respiratory distress.      Breath sounds: Normal breath sounds. No wheezing or rales.   Chest:      Chest wall: No tenderness.   Abdominal:      General: Bowel sounds are normal. There is no distension.      Palpations: Abdomen is soft.      Tenderness: There is no abdominal tenderness.   Musculoskeletal:         General: No tenderness or deformity. Normal range of motion.      Cervical back: Normal range of motion and neck supple.      Right lower leg: No edema.      Left lower leg: No edema.   Skin:     General: Skin is warm and dry.      Findings: No erythema or rash.   Neurological:      Mental Status: He is alert and oriented to person, place, and time.      Cranial Nerves: No cranial nerve deficit.      Motor: No abnormal muscle tone.      Coordination: Coordination normal.   Psychiatric:         Behavior: Behavior normal.         Thought Content: Thought content normal.         Judgment: Judgment normal.           Lab Results   Component Value Date    CHOL 176 08/27/2018    CHOL 169 01/04/2017     Lab Results   Component Value Date    HDL 44 08/27/2018    HDL 55 01/04/2017     Lab Results   Component Value Date    LDLCALC 107.0 08/27/2018    LDLCALC 82.4 01/04/2017     Lab Results   Component Value Date    ALT 35 10/02/2024    AST 33 10/02/2024    AST 33 08/17/2023    AST 47 (H) 10/13/2022     Lab Results   Component Value Date    CREATININE 0.61 08/17/2023    BUN 9 08/17/2023     08/17/2023    K 3.9 08/17/2023    CO2 26 08/17/2023    CO2 28 10/13/2022    CO2 29 01/06/2021     Lab Results   Component Value Date    HGB 13.2 (L) 10/02/2024    HCT 38.5 (L) 10/02/2024    HCT 39.9 (L) 08/17/2023    HCT 41.4 10/13/2022                     Normal sinus rhythm, normal EKG    Assessment and Plan:     Dizziness  Comments:  Nonspecific, present for years, doubt cardiac etiology   No orthostatic blood pressure drop    Peripheral sensory-motor axonal polyneuropathy    Dyslipidemia  Comments:  Well  controlled on current therapy    Deep vein thrombosis (DVT) of proximal lower extremity, unspecified chronicity, unspecified laterality  Comments:  Remote, not recurrent, not on anticoagulation         No follow-ups on file.          Future Appointments   Date Time Provider Department Center   1/16/2025  2:30 PM [facility]

## 2024-12-26 LAB
OHS QRS DURATION: 86 MS
OHS QTC CALCULATION: 414 MS

## 2025-01-16 ENCOUNTER — OFFICE VISIT (OUTPATIENT)
Dept: GASTROENTEROLOGY | Facility: CLINIC | Age: 75
End: 2025-01-16
Payer: MEDICARE

## 2025-01-16 VITALS
SYSTOLIC BLOOD PRESSURE: 135 MMHG | DIASTOLIC BLOOD PRESSURE: 62 MMHG | HEIGHT: 71 IN | WEIGHT: 185.5 LBS | HEART RATE: 69 BPM | BODY MASS INDEX: 25.97 KG/M2

## 2025-01-16 DIAGNOSIS — K21.9 GERD WITHOUT ESOPHAGITIS: ICD-10-CM

## 2025-01-16 DIAGNOSIS — K22.4 ESOPHAGEAL DYSMOTILITY: Primary | ICD-10-CM

## 2025-01-16 PROCEDURE — 99215 OFFICE O/P EST HI 40 MIN: CPT | Mod: PBBFAC,PN | Performed by: NURSE PRACTITIONER

## 2025-01-16 PROCEDURE — 99999 PR PBB SHADOW E&M-EST. PATIENT-LVL V: CPT | Mod: PBBFAC,,, | Performed by: NURSE PRACTITIONER

## 2025-01-16 PROCEDURE — 99214 OFFICE O/P EST MOD 30 MIN: CPT | Mod: S$PBB,,, | Performed by: NURSE PRACTITIONER

## 2025-01-16 RX ORDER — LANOLIN ALCOHOL/MO/W.PET/CERES
1000 CREAM (GRAM) TOPICAL
COMMUNITY
Start: 2024-10-02

## 2025-01-16 RX ORDER — PREGABALIN 50 MG/1
1 CAPSULE ORAL 2 TIMES DAILY
COMMUNITY

## 2025-01-16 RX ORDER — AMITRIPTYLINE HYDROCHLORIDE 25 MG/1
25 TABLET, FILM COATED ORAL NIGHTLY
Qty: 30 TABLET | Refills: 5 | Status: SHIPPED | OUTPATIENT
Start: 2025-01-16 | End: 2026-01-16

## 2025-01-16 RX ORDER — PANTOPRAZOLE SODIUM 40 MG/1
40 TABLET, DELAYED RELEASE ORAL
COMMUNITY
Start: 2024-10-02

## 2025-01-16 RX ORDER — METOCLOPRAMIDE 5 MG/1
TABLET ORAL
COMMUNITY
Start: 2024-11-05 | End: 2025-01-16

## 2025-01-16 RX ORDER — MIRABEGRON 25 MG/1
25 TABLET, FILM COATED, EXTENDED RELEASE ORAL
COMMUNITY
Start: 2024-03-28

## 2025-01-21 NOTE — PROGRESS NOTES
Subjective:       Patient ID: Antione Torres is a 75 y.o. male.    Chief Complaint: Dysphagia    76 y/o male with multiple diagnoses as listed in problem list and medical history presents to clinic with c/o GERD and dysphagia. Accompanied by wife. Patient is new to me. Last seen in GI clinic 9/2023 by Dr. Purdy. Reports chronic dysphagia for the last 2 years. Has intermittent difficulty swallowing solid food and rarely liquids. Constantly clearing his throat and feels food is stuck in his throat just above the sternal notch down to the center of his chest. Repeatedly swallows to resolve sensation. Tried Reglan for a month prescribed by PCP with no relief. Heartburn controlled with daily PPI. Denies regurgitation. Normal appetite. No unintentional weight loss. BMs daily. No constipation or diarrhea.        Previous GI workup  - 8/21/2023 EGD: Normal esophagus. Biopsies not taken due to patient with globus sensation, dysphagia to solids and liquids and no evidence of EoE with normal esophagus. Erythematous mucosa in the antrum. Biopsied. Normal examined duodenum.   - 9/13/2023 Esophagram: Gastroesophageal reflux. Small sliding-type hiatal hernia. Esophageal dysmotility.   - 9/28/2023 Esophageal manometry: Esophagogastric junction (EGJ) outflow obstruction was not found, based on manometry results. Normal esophageal manometry.   - 12/15/2023 EGD: Normal esophagus. Biopsied.   Z-line regular, 39 cm from the incisors. Abnormal esophageal motility. Gastritis. Biopsied. Normal duodenal bulb, first portion of the duodenum and second portion of the duodenum.   - 12/15/2023 Colonoscopy: Three 3 to 4 mm polyps in the transverse colon, removed with a cold snare. Resected and retrieved. The examination was otherwise normal on direct and retroflexion views. The examined portion of the ileum was normal. Internal hemorrhoids.     Past Medical History:   Diagnosis Date    Arthritis     Cancer 2000    prostate    Depression      Digestive disorder     Hypertension     Sleep apnea        Past Surgical History:   Procedure Laterality Date    CHOLECYSTECTOMY      COLONOSCOPY      COLONOSCOPY N/A 7/25/2016    Procedure: COLONOSCOPY;  Surgeon: Stanley Woo MD;  Location: [facility] ([address] [place] [address]);  Service: Endoscopy;  Laterality: N/A;    COLONOSCOPY N/A 12/15/2023    Procedure: COLONOSCOPY;  Surgeon: Zara Drew MD;  Location: [facility] ENDOSCOPY;  Service: Endoscopy;  Laterality: N/A;  Pt ok with any provider to have procedure before end of year / PEG / Inst portal- LW  12/8- pre call complete. DBM    ESOPHAGEAL MANOMETRY WITH MEASUREMENT OF IMPEDANCE N/A 9/27/2023    Procedure: MANOMETRY, ESOPHAGUS, WITH IMPEDANCE MEASUREMENT;  Surgeon: Chuy Carreno MD;  Location: [address]);  Service: Endoscopy;  Laterality: N/A;  esophageal motility for evaluation of dysphagia intermittent solid food dysphagia proximal esophageal region  Referred by: Dr. Purdy  Route instructions sent: [id number]  09/20/2023 - precall completed, arrival, location, no ride neede    ESOPHAGOGASTRODUODENOSCOPY N/A 8/21/2023    Procedure: EGD (ESOPHAGOGASTRODUODENOSCOPY);  Surgeon: Nasir Mann MD;  Location: [address]);  Service: Endoscopy;  Laterality: N/A;  Procedure Timing: < 2 week  Provider: Any endoscopist   Location: No Preference   Additional Scheduling Information: No scheduling concerns  8/16 ref. by Dr. Purdy, instr. to [facility]    ESOPHAGOGASTRODUODENOSCOPY N/A 12/15/2023    Procedure: EGD (ESOPHAGOGASTRODUODENOSCOPY);  Surgeon: Zara Drew MD;  Location: [facility] ENDOSCOPY;  Service: Endoscopy;  Laterality: N/A;    HERNIA REPAIR      NECK SURGERY      PROSTATE SURGERY         Family History   Problem Relation Name Age of Onset    Pancreatic cancer Maternal Uncle Crow 75    Celiac disease Neg Hx      Cirrhosis Neg Hx      Colon cancer Neg Hx      Colon polyps Neg Hx      Esophageal cancer Neg Hx      Inflammatory bowel disease  Neg Hx      Liver cancer Neg Hx      Rectal cancer Neg Hx      Stomach cancer Neg Hx      Ulcerative colitis Neg Hx      Pancreatitis Neg Hx      Kidney cancer Neg Hx      Dupont's esophagus Neg Hx      Bladder Cancer Neg Hx      Uterine cancer Neg Hx      Ovarian cancer Neg Hx      Crohn's disease Neg Hx      Irritable bowel syndrome Neg Hx      Liver disease Neg Hx      Hemochromatosis Neg Hx         Social History     Socioeconomic History    Marital status:    Tobacco Use    Smoking status: Former     Current packs/day: 0.00     Types: Cigarettes     Quit date: 1999     Years since quittin.2    Smokeless tobacco: Never    Tobacco comments:     cigarettes   Substance and Sexual Activity    Alcohol use: Not Currently     Alcohol/week: 1.0 standard drink of alcohol     Types: 1 Shots of liquor per week     Comment: moderate    Drug use: No    Sexual activity: Yes     Partners: Female   Social History Narrative    Retired 2004 - Worked for [employer] as         Wife is Nurse for [employer]        Kids: Three Daughter: , ,       Social Drivers of Health     Financial Resource Strain: Low Risk  (2024)    Overall Financial Resource Strain (CARDIA)     Difficulty of Paying Living Expenses: Not hard at all   Food Insecurity: No Food Insecurity (2024)    Hunger Vital Sign     Worried About Running Out of Food in the Last Year: Never true     Ran Out of Food in the Last Year: Never true   Transportation Needs: No Transportation Needs (2024)    PRAPARE - Transportation     Lack of Transportation (Medical): No     Lack of Transportation (Non-Medical): No   Physical Activity: Unknown (2024)    Exercise Vital Sign     Days of Exercise per Week: 0 days   Housing Stability: Low Risk  (2024)    Housing Stability Vital Sign     Unable to Pay for Housing in the Last Year: No     Number of Places Lived in the Last Year: 1     Unstable Housing in the Last  "Year: No       Review of Systems   Constitutional:  Negative for appetite change and unexpected weight change.   HENT:  Positive for trouble swallowing.    Respiratory:  Negative for cough and choking.    Cardiovascular:  Negative for chest pain.   Gastrointestinal:  Negative for abdominal pain, blood in stool, constipation, diarrhea, nausea and vomiting.   Hematological:  Negative for adenopathy. Does not bruise/bleed easily.   Psychiatric/Behavioral:  Negative for dysphoric mood.          Objective:     Vitals:    01/16/25 1431   BP: 135/62   BP Location: Right arm   Patient Position: Sitting   Pulse: 69   Weight: 84.1 kg (185 lb 8.3 oz)   Height: 5' 11" (1.803 m)          Physical Exam  Constitutional:       General: He is not in acute distress.     Appearance: Normal appearance.   HENT:      Head: Normocephalic.   Eyes:      Conjunctiva/sclera: Conjunctivae normal.   Pulmonary:      Effort: Pulmonary effort is normal. No respiratory distress.   Musculoskeletal:         General: Normal range of motion.      Cervical back: Normal range of motion.   Skin:     General: Skin is warm and dry.   Neurological:      Mental Status: He is alert and oriented to person, place, and time.   Psychiatric:         Behavior: Behavior normal.               Assessment:         ICD-10-CM ICD-9-CM   1. Esophageal dysmotility  K22.4 530.5   2. GERD without esophagitis  K21.9 530.81       Plan:       Esophageal dysmotility  -     amitriptyline (ELAVIL) 25 MG tablet; Take 1 tablet (25 mg total) by mouth every evening.  Dispense: 30 tablet; Refill: 5   -    Be sure to cut all meat and bulky vegetables into small pieces using knife/fork, do not rely on your teeth alone.  Also, try to drink some carbonated water (soda water) after you finish eating; the bubbles will "scrub" your esophagus to help little stuck things go down better.  -     Refer to dysmotility specialist  -     Ambulatory referral/consult to Gastroenterology; Future; " Expected date: 01/28/2025    GERD without esophagitis        -    Continue PPI daily    Follow up if symptoms worsen or fail to improve.     Patient's Medications   New Prescriptions    AMITRIPTYLINE (ELAVIL) 25 MG TABLET    Take 1 tablet (25 mg total) by mouth every evening.   Previous Medications    ACETAMINOPHEN (TYLENOL) 650 MG TBSR    2 tablets as needed    ALPRAZOLAM (XANAX) 2 MG TAB    Take 1 tablet (2 mg total) by mouth On call Procedure for Anxiety.    ATORVASTATIN (LIPITOR) 80 MG TABLET    80 mg.    AZATHIOPRINE (IMURAN) 50 MG TAB    Take 2 tablets (100 mg total) by mouth every morning AND 1 tablet (50 mg total) every evening.    CANNABIDIOL (EPIDIOLEX) 100 MG/ML    as directed    CHOLECALCIFEROL, VITAMIN D3, (VITAMIN D3) 50 MCG (2,000 UNIT) TAB    TAKE 2000UNIT BY MOUTH ONCE DAILY FOR VITAMIN D.  TAKE WITH FOOD.    CYANOCOBALAMIN (VITAMIN B-12) 1000 MCG TABLET    Take 1,000 mcg by mouth.    DULOXETINE (CYMBALTA) 30 MG CAPSULE    Take 3 capsules by mouth once daily.    EZETIMIBE (ZETIA) 10 MG TABLET    Take 10 mg by mouth once daily.    LOSARTAN (COZAAR) 50 MG TABLET    Take 50 mg by mouth once daily.    MIRABEGRON (MYRBETRIQ) 25 MG TB24 ER TABLET    Take 25 mg by mouth.    MULTIVITAMIN (THERAGRAN) PER TABLET    Take 1 tablet by mouth once daily.    OXYBUTYNIN (DITROPAN) 5 MG TAB    Take 1 tablet (5 mg total) by mouth 3 (three) times daily.    PANTOPRAZOLE (PROTONIX) 40 MG TABLET    Take 40 mg by mouth.    PAROXETINE (PAXIL) 30 MG TABLET    Take 30 mg by mouth every morning.    PRAZOSIN (MINIPRESS) 5 MG CAPSULE    Take 1 capsule by mouth every evening.    PREGABALIN (LYRICA) 50 MG CAPSULE    Take 1 capsule by mouth 2 (two) times daily.    ZINC SULFATE (ZINC-15) 66 MG TAB    2 tablets   Modified Medications    No medications on file   Discontinued Medications    METOCLOPRAMIDE HCL (REGLAN) 5 MG TABLET    TAKE 1 TABLET BY MOUTH THREE TIMES DAILY BEFORE MEALS    RABEPRAZOLE (ACIPHEX) 20 MG TABLET    Take 1  tablet (20 mg total) by mouth before breakfast.

## 2025-01-25 ENCOUNTER — TELEPHONE (OUTPATIENT)
Dept: GASTROENTEROLOGY | Facility: CLINIC | Age: 75
End: 2025-01-25
Payer: MEDICARE

## 2025-01-25 NOTE — TELEPHONE ENCOUNTER
----- Message from Seda Bernardo MD sent at 1/23/2025  2:39 PM CST -----  Sure    [facility] team, please schedule or place on the motility waitlist.    Thanks,    Seda  ----- Message -----  From: Teresa Westfall FNP  Sent: 1/23/2025   1:10 PM CST  To: Seda Bernardo MD; [facility] Staff    Good afternoon,    The above named patient has history of chronic dysphagia. Previously seen by Dr. Purdy. Non-specific esophageal dysmotility noted on esophagram and EGD in 2023. Follow up manometry was normal. PPI therapy does not help. I recently started on Elavil. Can you see patient for evaluation and discuss of other treatment options?    Thanks,    Teresa Westfall NP  Gastroenterology - [facility]

## 2025-04-16 ENCOUNTER — TELEPHONE (OUTPATIENT)
Dept: NEUROLOGY | Facility: CLINIC | Age: 75
End: 2025-04-16
Payer: MEDICARE

## 2025-04-16 NOTE — TELEPHONE ENCOUNTER
----- Message from Estefanía sent at 4/14/2025 12:32 PM CDT -----  Regarding: appt  Type:  Patient Returning CallName of who is calling:Ruth/wifeWhat is request in detail:Ruth is requesting a call back in regards to  patient being seen my Dr. Herrera, nothing is available online. He is a pt of Dr. Posey at [facility], who is leaving and he need to est care with another doctor.Can clinic reply by [facility]:What [facility] to [facility] back [facility] not [facility]

## 2025-07-17 ENCOUNTER — LAB VISIT (OUTPATIENT)
Dept: LAB | Facility: HOSPITAL | Age: 75
End: 2025-07-17
Payer: MEDICARE

## 2025-07-17 ENCOUNTER — OFFICE VISIT (OUTPATIENT)
Facility: CLINIC | Age: 75
End: 2025-07-17
Payer: MEDICARE

## 2025-07-17 VITALS
SYSTOLIC BLOOD PRESSURE: 126 MMHG | BODY MASS INDEX: 26.6 KG/M2 | HEART RATE: 80 BPM | DIASTOLIC BLOOD PRESSURE: 72 MMHG | WEIGHT: 190 LBS | HEIGHT: 71 IN

## 2025-07-17 DIAGNOSIS — E53.8 B12 DEFICIENCY: Primary | ICD-10-CM

## 2025-07-17 DIAGNOSIS — G61.81 CIDP (CHRONIC INFLAMMATORY DEMYELINATING POLYNEUROPATHY): ICD-10-CM

## 2025-07-17 DIAGNOSIS — E53.8 B12 DEFICIENCY: ICD-10-CM

## 2025-07-17 PROCEDURE — 99999 PR PBB SHADOW E&M-EST. PATIENT-LVL IV: CPT | Mod: PBBFAC,,, | Performed by: PSYCHIATRY & NEUROLOGY

## 2025-07-17 PROCEDURE — 36415 COLL VENOUS BLD VENIPUNCTURE: CPT

## 2025-07-17 PROCEDURE — 99214 OFFICE O/P EST MOD 30 MIN: CPT | Mod: PBBFAC | Performed by: PSYCHIATRY & NEUROLOGY

## 2025-07-17 RX ORDER — FOLIC ACID 1 MG/1
1 TABLET ORAL DAILY
COMMUNITY

## 2025-07-17 RX ORDER — RABEPRAZOLE SODIUM 20 MG/1
20 TABLET, DELAYED RELEASE ORAL DAILY
COMMUNITY

## 2025-07-17 RX ORDER — AZATHIOPRINE 50 MG/1
TABLET ORAL
Qty: 270 TABLET | Refills: 4 | Status: SHIPPED | OUTPATIENT
Start: 2025-07-17 | End: 2026-07-17

## 2025-07-17 NOTE — PROGRESS NOTES
[facility] - NEUROLOGY [address] [facility], [place]         Patient ID: [id number]  Referring Physician:  Previous patient of Dr. Rick Posey    Chief Complaint:  CIDP     Subjective:     HISTORY OF PRESENT ILLNESS:  Mr. Torres is a 75-yr-old man who presents today for follow up of chronic inflammatory demyelinating polyneuropathy (CIDP).  He is accompanied by his wife for this appointment.    He reports long-standing history of motor neuropathy diagnosed as CIDP. Initially treated with IVIG with positive response allowing improved movement. Multiple treatment trials including steroids were ineffective. Currently maintained on Azathioprine 150 mg daily (two tablets in morning, one tablet in evening) with stable treatment regimen for an extended period. He reports progressive neurological symptoms including weakness and drop foot predominantly affecting the right side, with initial symptoms starting on the left. Currently unable to pull right foot back. He experiences new onset tingling and numbness localized to the balls of feet and toes, which is more intense than previously experienced. He describes back weakness that occasionally causes him to feel like he might fall, accompanied by back pain. He also reports ongoing dizziness without a definitive diagnosis, for which he recently underwent ENT evaluation that ruled out ear-related causes. He denies significant improvement in symptoms but believes current treatments may be helping to slow neuropathy progression. He acknowledges potential risk of axonal loss over time with ongoing condition.    MEDICAL HISTORY:  Prostate cancer diagnosed, potentially related to Agent Orange exposure. Neck surgery performed approximately eight years ago. Gallbladder removal completed in the past. Chronic reflux with esophageal immobility, affecting dietary intake. Arthritis affecting multiple body regions, including back and shoulders.    EXPOSURE  HISTORY:  He is a [place]  with reported exposure to Agent Orange during  service. 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 He believes these chemical exposures may have contributed to his current medical condition. He was informed by [facility] representatives that Agent Orange is now recognized as potentially causing long-term neuropathy.    CURRENT TREATMENT:  He is currently participating in physical therapy, including core strengthening and vestibular rehabilitation, with exercises focused on addressing back weakness and balance.  He takes Azathioprine (Imuran) total dose 150 mg.        ROS:  Cardiovascular: +lower extremity edema  Gastrointestinal: +feeding difficulties, +difficulty swallowing, +indigestion  Musculoskeletal: +back pain, +muscle weakness, +limb pain, +pain with movement  Neurological: +dizziness, +numbness, +tingling        Past Medical History:  -------------------------------------    Arthritis    Cancer    prostate    Depression    Digestive disorder    Hypertension    Sleep apnea       Allergies:  Review of patient's allergies indicates:   Allergen Reactions    Pcn [penicillins] Hives, Swelling and Rash       Pertinent Family History:  Family History   Problem Relation Name Age of Onset    Pancreatic cancer Maternal Uncle Crow Richard    Celiac disease Neg Hx      Cirrhosis Neg Hx      Colon cancer Neg Hx      Colon polyps Neg Hx      Esophageal cancer Neg Hx      Inflammatory bowel disease Neg Hx      Liver cancer Neg Hx      Rectal cancer Neg Hx      Stomach cancer Neg Hx      Ulcerative colitis Neg Hx      Pancreatitis Neg Hx      Kidney cancer Neg Hx      Dupont's esophagus Neg Hx      Bladder Cancer Neg Hx      Uterine cancer Neg Hx      Ovarian cancer Neg Hx      Crohn's disease Neg Hx      Irritable bowel syndrome Neg Hx      Liver disease Neg Hx      Hemochromatosis Neg Hx         Pertinent Social History:  Social  History[1]    Medications:  Current Outpatient Medications   Medication Instructions    acetaminophen (TYLENOL) 650 MG TbSR 2 tablets as needed    ALPRAZolam (XANAX) 2 mg, Oral, On Call Procedure    amitriptyline (ELAVIL) 25 mg, Oral, Nightly    atorvastatin (LIPITOR) 80 mg    azaTHIOprine (IMURAN) 50 mg Tab Take 2 tablets (100 mg total) by mouth every morning AND 1 tablet (50 mg total) every evening.    cannabidioL (EPIDIOLEX) 100 mg/mL as directed    cholecalciferol, vitamin D3, (VITAMIN D3) 50 mcg (2,000 unit) Tab TAKE 2000UNIT BY MOUTH ONCE DAILY FOR VITAMIN D.  TAKE WITH FOOD.    cyanocobalamin (VITAMIN B-12) 1,000 mcg    DULoxetine (CYMBALTA) 30 MG capsule 3 capsules, Daily    ezetimibe (ZETIA) 10 mg, Daily    folic acid (FOLVITE) 1 mg, Daily    losartan (COZAAR) 50 mg, Daily    mirabegron (MYRBETRIQ) 25 mg    multivitamin (THERAGRAN) per tablet 1 tablet, Daily    oxybutynin (DITROPAN) 5 mg, Oral, 3 times daily    pantoprazole (PROTONIX) 40 mg    paroxetine (PAXIL) 30 mg, Every morning    prazosin (MINIPRESS) 5 MG capsule 1 capsule, Nightly    pregabalin (LYRICA) 50 MG capsule 1 capsule, 2 times daily    RABEprazole (ACIPHEX) 20 mg, Daily    zinc sulfate (ZINC-15) 66 mg Tab 2 tablets        Objective:     Vitals:    07/17/25 0949   BP: 126/72   Pulse: 80        General:  Well-appearing, well-nourished and in NAD  HEENT:  Normocephalic, atraumatic  Musculoskeletal:  Normal joints  Extremities:  No clubbing, cyanosis, or edema    Neurologic Exam:   Awake, alert, and oriented x3  Speech spontaneous and fluent, intact comprehension.   Adequate fund of knowledge, vocabulary.    CN II - CN XII:  PERRLA. EOM intact. No Nystagmus. No ophthalmoplegia. No papilledema  Facial sensation is normal to light touch.   Facial expression is full and symmetric.   Hearing is intact bilaterally.   Palate elevates symmetrically.   SCM and Trapezius full strength bilaterally.   Tongue is midline.     Motor:  4/5 strength with  "shoulder abduction/elevation (has shoulder issues); no atrophy of upper extremity muscles  5/5 strength with hip/leg flexion and extension.  +Foot drop bilaterally--0/5 with DF. 4/5 PL    Sensory:  Decreased to discrimination to thighs    Coordination:  No tremors at rest or with action    Gait:  Wears AFO's; unsteady without them.  Uses walker at home, has scooter for distance (today)    Pertinent lab results  Lab Results   Component Value Date    FOLATE 16.5 08/17/2023    ZLDUOFRL18 1041 (H) 08/17/2023    WPWKUZXH66PP 49 08/17/2023     No results found for: "METHLYMALONI", "ARSENICBLD", "LEADBLOOD", "CADMIUM", "MERCURYBLOOD"  Lab Results   Component Value Date    PATHINTPSPE REVIEWED 10/21/2022    PATHINTPSIF REVIEWED 10/21/2022     Lab Results   Component Value Date    ANASCREEN Negative <1:80 08/17/2023    TTGIGA 0.9 08/17/2023     Lab Results   Component Value Date    HEPAIGG Reactive 08/17/2023    HEPBSAG Non-reactive 08/17/2023     Lab Results   Component Value Date    IGGSERUM 1045 08/17/2023     08/17/2023    TSH 3.000 08/17/2023    WBC 6.31 10/02/2024    LYMPH 1.7 08/17/2023    LYMPH 28.0 08/17/2023    RBC 3.71 (L) 10/02/2024    HGB 13.2 (L) 10/02/2024    HCT 38.5 (L) 10/02/2024     (H) 10/02/2024     10/02/2024     08/17/2023    K 3.9 08/17/2023    CO2 26 08/17/2023    BUN 9 08/17/2023    CREATININE 0.61 08/17/2023    CALCIUM 9.1 08/17/2023    AST 33 10/02/2024    ALT 35 10/02/2024       Assessment and Plan:   Mr. Torres is a 75 y.o. RH male with CIDP.  Stable on current regimen of Imuran 150 mg/day.  That is a little over 2 mg/kg/day which is a standard dose.  He's had B12 deficiency in the past and has taken supplements.  Labs from outside visit reviewed on his phone.  CBC, CMP normal except for MCV elevation (>100, a little lower than previous levels).  No anemia.      CIDP (CHRONIC INFLAMMATORY DEMYELINATING POLYNEUROPATHY):   Continued Azathioprine (Imuran) 150 mg daily " (50 mg twice daily in the morning, 50 mg once at night) for CIDP management, as stabilization of symptoms reported.   Mr. Torres to continue current physical therapy regimen, focusing on strengthening and balance exercises.   Considered nerve conduction study but deemed unnecessary at this time due to lack of significant symptom progression, citing minimal impact on current treatment plan.   Evaluated for potential causes of increased tingling/numbness in feet, including possible back issues.   Discussed potential link between Agent Orange exposure and neuropathy, acknowledging government's evolving stance on long-term effects.   Follow up in 6 months.   Contact the office if symptoms change or worsen before next appointment.    B12 DEFICIENCY:   Assessed vitamin B12 status due to elevated MCV.   Ordered B12 and folate labs.   Reviewed recent chest imaging findings, noting scheduled abdominal scan to investigate atelectasis.    PLAN SUMMARY:   Order abdominal scan to investigate atelectasis   Order B12 and folate labs   Continue Azathioprine (Imuran) 150 mg daily for CIDP management   Continue current physical therapy regimen, focusing on strengthening and balance exercises   Follow up in 6 months   Contact office if symptoms change or worsen before next appointment                  This is a patient with a complex neurologic diagnosis whose overall, ongoing care is being managed and monitored by me and our Neurology clinic.   As such, since 2024,  is the appropriate add-on code to accompany the other E/M billing for this visit.    This note was generated with the assistance of ambient listening technology. Verbal consent was obtained by the patient and accompanying visitor(s) for the recording of patient appointment to facilitate this note. I attest to having reviewed and edited the generated note for accuracy, though some syntax or spelling errors may persist. Please contact the author of this note for any  clarification.                     [1]   Social History  Tobacco Use    Smoking status: Former     Current packs/day: 0.00     Types: Cigarettes     Quit date: 1999     Years since quittin.7    Smokeless tobacco: Never    Tobacco comments:     cigarettes   Substance Use Topics    Alcohol use: Not Currently     Alcohol/week: 1.0 standard drink of alcohol     Types: 1 Shots of liquor per week     Comment: moderate    Drug use: No

## 2025-07-18 LAB — VIT B12 SERPL-MCNC: 1035 NG/L (ref 180–914)

## 2025-07-20 ENCOUNTER — RESULTS FOLLOW-UP (OUTPATIENT)
Facility: CLINIC | Age: 75
End: 2025-07-20
Payer: MEDICARE

## 2025-07-23 DIAGNOSIS — K22.4 ESOPHAGEAL DYSMOTILITY: ICD-10-CM

## 2025-07-23 RX ORDER — AMITRIPTYLINE HYDROCHLORIDE 25 MG/1
25 TABLET, FILM COATED ORAL NIGHTLY
Qty: 30 TABLET | Refills: 5 | Status: SHIPPED | OUTPATIENT
Start: 2025-07-23